# Patient Record
Sex: FEMALE | Race: WHITE | NOT HISPANIC OR LATINO | URBAN - METROPOLITAN AREA
[De-identification: names, ages, dates, MRNs, and addresses within clinical notes are randomized per-mention and may not be internally consistent; named-entity substitution may affect disease eponyms.]

---

## 2023-11-28 ENCOUNTER — EMERGENCY (EMERGENCY)
Facility: HOSPITAL | Age: 73
LOS: 0 days | Discharge: HOME | End: 2023-11-28
Attending: EMERGENCY MEDICINE
Payer: MEDICARE

## 2023-11-28 VITALS
OXYGEN SATURATION: 94 % | SYSTOLIC BLOOD PRESSURE: 138 MMHG | TEMPERATURE: 98 F | RESPIRATION RATE: 15 BRPM | DIASTOLIC BLOOD PRESSURE: 84 MMHG | HEART RATE: 84 BPM

## 2023-11-28 VITALS
DIASTOLIC BLOOD PRESSURE: 98 MMHG | HEIGHT: 58 IN | SYSTOLIC BLOOD PRESSURE: 167 MMHG | HEART RATE: 105 BPM | OXYGEN SATURATION: 97 % | TEMPERATURE: 99 F | WEIGHT: 184.97 LBS | RESPIRATION RATE: 19 BRPM

## 2023-11-28 DIAGNOSIS — R42 DIZZINESS AND GIDDINESS: ICD-10-CM

## 2023-11-28 DIAGNOSIS — I10 ESSENTIAL (PRIMARY) HYPERTENSION: ICD-10-CM

## 2023-11-28 DIAGNOSIS — R20.0 ANESTHESIA OF SKIN: ICD-10-CM

## 2023-11-28 DIAGNOSIS — E78.5 HYPERLIPIDEMIA, UNSPECIFIED: ICD-10-CM

## 2023-11-28 DIAGNOSIS — Z86.39 PERSONAL HISTORY OF OTHER ENDOCRINE, NUTRITIONAL AND METABOLIC DISEASE: ICD-10-CM

## 2023-11-28 LAB
ALBUMIN SERPL ELPH-MCNC: 3.7 G/DL — SIGNIFICANT CHANGE UP (ref 3.3–5)
ALBUMIN SERPL ELPH-MCNC: 3.7 G/DL — SIGNIFICANT CHANGE UP (ref 3.3–5)
ALP SERPL-CCNC: 111 U/L — SIGNIFICANT CHANGE UP (ref 40–120)
ALP SERPL-CCNC: 111 U/L — SIGNIFICANT CHANGE UP (ref 40–120)
ALT FLD-CCNC: 34 U/L — SIGNIFICANT CHANGE UP (ref 12–78)
ALT FLD-CCNC: 34 U/L — SIGNIFICANT CHANGE UP (ref 12–78)
ANION GAP SERPL CALC-SCNC: 7 MMOL/L — SIGNIFICANT CHANGE UP (ref 5–17)
ANION GAP SERPL CALC-SCNC: 7 MMOL/L — SIGNIFICANT CHANGE UP (ref 5–17)
APTT BLD: 36.1 SEC — HIGH (ref 24.5–35.6)
APTT BLD: 36.1 SEC — HIGH (ref 24.5–35.6)
AST SERPL-CCNC: 12 U/L — LOW (ref 15–37)
AST SERPL-CCNC: 12 U/L — LOW (ref 15–37)
BASOPHILS # BLD AUTO: 0.04 K/UL — SIGNIFICANT CHANGE UP (ref 0–0.2)
BASOPHILS # BLD AUTO: 0.04 K/UL — SIGNIFICANT CHANGE UP (ref 0–0.2)
BASOPHILS NFR BLD AUTO: 0.8 % — SIGNIFICANT CHANGE UP (ref 0–2)
BASOPHILS NFR BLD AUTO: 0.8 % — SIGNIFICANT CHANGE UP (ref 0–2)
BILIRUB SERPL-MCNC: 0.4 MG/DL — SIGNIFICANT CHANGE UP (ref 0.2–1.2)
BILIRUB SERPL-MCNC: 0.4 MG/DL — SIGNIFICANT CHANGE UP (ref 0.2–1.2)
BUN SERPL-MCNC: 18 MG/DL — SIGNIFICANT CHANGE UP (ref 7–23)
BUN SERPL-MCNC: 18 MG/DL — SIGNIFICANT CHANGE UP (ref 7–23)
CALCIUM SERPL-MCNC: 9.3 MG/DL — SIGNIFICANT CHANGE UP (ref 8.5–10.1)
CALCIUM SERPL-MCNC: 9.3 MG/DL — SIGNIFICANT CHANGE UP (ref 8.5–10.1)
CHLORIDE SERPL-SCNC: 107 MMOL/L — SIGNIFICANT CHANGE UP (ref 96–108)
CHLORIDE SERPL-SCNC: 107 MMOL/L — SIGNIFICANT CHANGE UP (ref 96–108)
CO2 SERPL-SCNC: 28 MMOL/L — SIGNIFICANT CHANGE UP (ref 22–31)
CO2 SERPL-SCNC: 28 MMOL/L — SIGNIFICANT CHANGE UP (ref 22–31)
CREAT SERPL-MCNC: 1.3 MG/DL — SIGNIFICANT CHANGE UP (ref 0.5–1.3)
CREAT SERPL-MCNC: 1.3 MG/DL — SIGNIFICANT CHANGE UP (ref 0.5–1.3)
EGFR: 43 ML/MIN/1.73M2 — LOW
EGFR: 43 ML/MIN/1.73M2 — LOW
EOSINOPHIL # BLD AUTO: 0.18 K/UL — SIGNIFICANT CHANGE UP (ref 0–0.5)
EOSINOPHIL # BLD AUTO: 0.18 K/UL — SIGNIFICANT CHANGE UP (ref 0–0.5)
EOSINOPHIL NFR BLD AUTO: 3.4 % — SIGNIFICANT CHANGE UP (ref 0–6)
EOSINOPHIL NFR BLD AUTO: 3.4 % — SIGNIFICANT CHANGE UP (ref 0–6)
GLUCOSE SERPL-MCNC: 121 MG/DL — HIGH (ref 70–99)
GLUCOSE SERPL-MCNC: 121 MG/DL — HIGH (ref 70–99)
HCT VFR BLD CALC: 38.3 % — SIGNIFICANT CHANGE UP (ref 34.5–45)
HCT VFR BLD CALC: 38.3 % — SIGNIFICANT CHANGE UP (ref 34.5–45)
HGB BLD-MCNC: 12.7 G/DL — SIGNIFICANT CHANGE UP (ref 11.5–15.5)
HGB BLD-MCNC: 12.7 G/DL — SIGNIFICANT CHANGE UP (ref 11.5–15.5)
IMM GRANULOCYTES NFR BLD AUTO: 0.4 % — SIGNIFICANT CHANGE UP (ref 0–0.9)
IMM GRANULOCYTES NFR BLD AUTO: 0.4 % — SIGNIFICANT CHANGE UP (ref 0–0.9)
INR BLD: 0.89 RATIO — SIGNIFICANT CHANGE UP (ref 0.85–1.18)
INR BLD: 0.89 RATIO — SIGNIFICANT CHANGE UP (ref 0.85–1.18)
LYMPHOCYTES # BLD AUTO: 1.25 K/UL — SIGNIFICANT CHANGE UP (ref 1–3.3)
LYMPHOCYTES # BLD AUTO: 1.25 K/UL — SIGNIFICANT CHANGE UP (ref 1–3.3)
LYMPHOCYTES # BLD AUTO: 23.6 % — SIGNIFICANT CHANGE UP (ref 13–44)
LYMPHOCYTES # BLD AUTO: 23.6 % — SIGNIFICANT CHANGE UP (ref 13–44)
MAGNESIUM SERPL-MCNC: 2.2 MG/DL — SIGNIFICANT CHANGE UP (ref 1.6–2.6)
MAGNESIUM SERPL-MCNC: 2.2 MG/DL — SIGNIFICANT CHANGE UP (ref 1.6–2.6)
MCHC RBC-ENTMCNC: 30.2 PG — SIGNIFICANT CHANGE UP (ref 27–34)
MCHC RBC-ENTMCNC: 30.2 PG — SIGNIFICANT CHANGE UP (ref 27–34)
MCHC RBC-ENTMCNC: 33.2 G/DL — SIGNIFICANT CHANGE UP (ref 32–36)
MCHC RBC-ENTMCNC: 33.2 G/DL — SIGNIFICANT CHANGE UP (ref 32–36)
MCV RBC AUTO: 91 FL — SIGNIFICANT CHANGE UP (ref 80–100)
MCV RBC AUTO: 91 FL — SIGNIFICANT CHANGE UP (ref 80–100)
MONOCYTES # BLD AUTO: 0.38 K/UL — SIGNIFICANT CHANGE UP (ref 0–0.9)
MONOCYTES # BLD AUTO: 0.38 K/UL — SIGNIFICANT CHANGE UP (ref 0–0.9)
MONOCYTES NFR BLD AUTO: 7.2 % — SIGNIFICANT CHANGE UP (ref 2–14)
MONOCYTES NFR BLD AUTO: 7.2 % — SIGNIFICANT CHANGE UP (ref 2–14)
NEUTROPHILS # BLD AUTO: 3.42 K/UL — SIGNIFICANT CHANGE UP (ref 1.8–7.4)
NEUTROPHILS # BLD AUTO: 3.42 K/UL — SIGNIFICANT CHANGE UP (ref 1.8–7.4)
NEUTROPHILS NFR BLD AUTO: 64.6 % — SIGNIFICANT CHANGE UP (ref 43–77)
NEUTROPHILS NFR BLD AUTO: 64.6 % — SIGNIFICANT CHANGE UP (ref 43–77)
NRBC # BLD: 0 /100 WBCS — SIGNIFICANT CHANGE UP (ref 0–0)
NRBC # BLD: 0 /100 WBCS — SIGNIFICANT CHANGE UP (ref 0–0)
PLATELET # BLD AUTO: 259 K/UL — SIGNIFICANT CHANGE UP (ref 150–400)
PLATELET # BLD AUTO: 259 K/UL — SIGNIFICANT CHANGE UP (ref 150–400)
POTASSIUM SERPL-MCNC: 3.4 MMOL/L — LOW (ref 3.5–5.3)
POTASSIUM SERPL-MCNC: 3.4 MMOL/L — LOW (ref 3.5–5.3)
POTASSIUM SERPL-SCNC: 3.4 MMOL/L — LOW (ref 3.5–5.3)
POTASSIUM SERPL-SCNC: 3.4 MMOL/L — LOW (ref 3.5–5.3)
PROT SERPL-MCNC: 7.7 GM/DL — SIGNIFICANT CHANGE UP (ref 6–8.3)
PROT SERPL-MCNC: 7.7 GM/DL — SIGNIFICANT CHANGE UP (ref 6–8.3)
PROTHROM AB SERPL-ACNC: 10.6 SEC — SIGNIFICANT CHANGE UP (ref 9.5–13)
PROTHROM AB SERPL-ACNC: 10.6 SEC — SIGNIFICANT CHANGE UP (ref 9.5–13)
RBC # BLD: 4.21 M/UL — SIGNIFICANT CHANGE UP (ref 3.8–5.2)
RBC # BLD: 4.21 M/UL — SIGNIFICANT CHANGE UP (ref 3.8–5.2)
RBC # FLD: 13.2 % — SIGNIFICANT CHANGE UP (ref 10.3–14.5)
RBC # FLD: 13.2 % — SIGNIFICANT CHANGE UP (ref 10.3–14.5)
SODIUM SERPL-SCNC: 142 MMOL/L — SIGNIFICANT CHANGE UP (ref 135–145)
SODIUM SERPL-SCNC: 142 MMOL/L — SIGNIFICANT CHANGE UP (ref 135–145)
TROPONIN I, HIGH SENSITIVITY RESULT: 7.3 NG/L — SIGNIFICANT CHANGE UP
TROPONIN I, HIGH SENSITIVITY RESULT: 7.3 NG/L — SIGNIFICANT CHANGE UP
WBC # BLD: 5.29 K/UL — SIGNIFICANT CHANGE UP (ref 3.8–10.5)
WBC # BLD: 5.29 K/UL — SIGNIFICANT CHANGE UP (ref 3.8–10.5)
WBC # FLD AUTO: 5.29 K/UL — SIGNIFICANT CHANGE UP (ref 3.8–10.5)
WBC # FLD AUTO: 5.29 K/UL — SIGNIFICANT CHANGE UP (ref 3.8–10.5)

## 2023-11-28 PROCEDURE — 70496 CT ANGIOGRAPHY HEAD: CPT | Mod: 26,MA

## 2023-11-28 PROCEDURE — 70498 CT ANGIOGRAPHY NECK: CPT | Mod: 26,MA

## 2023-11-28 PROCEDURE — 93010 ELECTROCARDIOGRAM REPORT: CPT

## 2023-11-28 PROCEDURE — 70450 CT HEAD/BRAIN W/O DYE: CPT | Mod: 26,MA,XU

## 2023-11-28 PROCEDURE — 99285 EMERGENCY DEPT VISIT HI MDM: CPT

## 2023-11-28 RX ORDER — POTASSIUM CHLORIDE 20 MEQ
40 PACKET (EA) ORAL ONCE
Refills: 0 | Status: COMPLETED | OUTPATIENT
Start: 2023-11-28 | End: 2023-11-28

## 2023-11-28 RX ORDER — SODIUM CHLORIDE 9 MG/ML
1000 INJECTION INTRAMUSCULAR; INTRAVENOUS; SUBCUTANEOUS ONCE
Refills: 0 | Status: COMPLETED | OUTPATIENT
Start: 2023-11-28 | End: 2023-11-28

## 2023-11-28 RX ADMIN — SODIUM CHLORIDE 1000 MILLILITER(S): 9 INJECTION INTRAMUSCULAR; INTRAVENOUS; SUBCUTANEOUS at 08:52

## 2023-11-28 RX ADMIN — Medication 40 MILLIEQUIVALENT(S): at 09:28

## 2023-11-28 NOTE — ED ADULT NURSE NOTE - NSFALLUNIVINTERV_ED_ALL_ED
Bed/Stretcher in lowest position, wheels locked, appropriate side rails in place/Call bell, personal items and telephone in reach/Instruct patient to call for assistance before getting out of bed/chair/stretcher/Non-slip footwear applied when patient is off stretcher/[place] to call system/Physically safe environment - no spills, clutter or unnecessary equipment/Purposeful proactive rounding/Room/bathroom lighting operational, light cord in reach

## 2023-11-28 NOTE — ED PROVIDER NOTE - CARE PROVIDER_API CALL
Terry Drake  Neurosurgery  [address] [address] [facility] [address]  [place] [address]  Phone: [phone]  Fax: [phone]  Follow Up Time: 4-6 Days    Delfina Tariq  Neurology  [address] [place] [address]  Phone: [phone]  Fax: [phone]  Follow Up Time: 4-6 Days

## 2023-11-28 NOTE — ED PROVIDER NOTE - CLINICAL SUMMARY MEDICAL DECISION MAKING FREE TEXT BOX
dizziness / lightheadedness. stroke scale is 0. will obtain ct head and ct angio to eval for vert insuff.   will obtain trop  check lytes, give fluids.  I read ekg as nsr rate 97, no st elevation or depression, qtc 469, narrow qrs, normal axis. dizziness / lightheadedness. stroke scale is 0. will obtain ct head and ct angio to eval for vert insuff.   will obtain trop  check lytes, give fluids.  I read ekg as nsr rate 97, no st elevation or depression, qtc 469, narrow qrs, normal axis.  ct reassuring. will let dc home and fu with neuro and neurosurgery

## 2023-11-28 NOTE — ED PROVIDER NOTE - PHYSICAL EXAMINATION
Gen: Alert, NAD  Head: NC, AT   Eyes: PERRL, EOMI, normal lids/conjunctiva  ENT: normal hearing, patent oropharynx without erythema/exudate, uvula midline  Neck: supple, no tenderness, Trachea midline  Pulm: Bilateral BS, normal resp effort, no wheeze/stridor/retractions  CV: RRR, no M/R/G, 2+ radial and dp pulses bl, no edema  Abd: soft, NT/ND, +BS, no hepatosplenomegaly  Mskel: extremities x4 with normal ROM and no joint effusions. no ctl spine ttp.   Skin: no rash, no bruising   Neuro: AAOx3, no sensory/motor deficits, CN 2-12 intact. finger to nose intact bl. neg romberg

## 2023-11-28 NOTE — ED PROVIDER NOTE - PROVIDER TOKENS
PROVIDER:[TOKEN:[[id number]],FOLLOWUP:[4-6 Days]],PROVIDER:[TOKEN:[[id number]],FOLLOWUP:[4-6 Days]]

## 2023-11-28 NOTE — ED PROVIDER NOTE - CARE PROVIDERS DIRECT ADDRESSES
,[email or web address]

## 2023-11-28 NOTE — ED ADULT NURSE NOTE - OBJECTIVE STATEMENT
Pt presents to the ED c/o left arm numbness and tingling x 6:30 am today after waking. Pt went to bed around midnight and endorse lightheadedness and tingling to left side of face. Pt is noted to be alert and oriented x4, no slurred speech, pronator drift, weakness of extremities or drooling was noted. Pt is ambulatory with steady gait. pt reports having palpitations this morning. Pt denies chest pain and was placed on cardiac monitor NSR noted in 80's. No peripheral edema was noted.

## 2023-11-28 NOTE — ED PROVIDER NOTE - OBJECTIVE STATEMENT
73F hx htn, hld, hypothyroid pw lightheadedness and left arm numbness. she woke up with these symptoms though notes she had left arm blood draw yesterday. no slurred speech, vision change, isolated weakness. no cp. no fever. doesn't make a diff if she is sitting or standing or laying

## 2023-11-28 NOTE — ED PROVIDER NOTE - PATIENT PORTAL LINK FT
You can access the FollowMyHealth Patient Portal offered by [facility] by registering at the following website: [email or web address]. By joining [employer]’s FollowMyHealth portal, you will also be able to view your health information using other applications (apps) compatible with our system.

## 2023-11-29 NOTE — ED POST DISCHARGE NOTE - REASON FOR FOLLOW-UP
Other Patient called stating that she received a message on her phone about setting up Neurology appointment. Patient states she will follow-up with her PMD next week and then have her refer to Neurology.

## 2023-12-01 ENCOUNTER — EMERGENCY (EMERGENCY)
Facility: HOSPITAL | Age: 73
LOS: 0 days | Discharge: HOME | End: 2023-12-01
Attending: EMERGENCY MEDICINE
Payer: MEDICARE

## 2023-12-01 VITALS
HEART RATE: 97 BPM | SYSTOLIC BLOOD PRESSURE: 141 MMHG | OXYGEN SATURATION: 97 % | RESPIRATION RATE: 14 BRPM | TEMPERATURE: 98 F | DIASTOLIC BLOOD PRESSURE: 83 MMHG

## 2023-12-01 VITALS
OXYGEN SATURATION: 98 % | SYSTOLIC BLOOD PRESSURE: 162 MMHG | TEMPERATURE: 99 F | WEIGHT: 182.1 LBS | RESPIRATION RATE: 18 BRPM | HEART RATE: 110 BPM | DIASTOLIC BLOOD PRESSURE: 91 MMHG | HEIGHT: 58 IN

## 2023-12-01 DIAGNOSIS — R14.0 ABDOMINAL DISTENSION (GASEOUS): ICD-10-CM

## 2023-12-01 DIAGNOSIS — R19.7 DIARRHEA, UNSPECIFIED: ICD-10-CM

## 2023-12-01 DIAGNOSIS — R11.0 NAUSEA: ICD-10-CM

## 2023-12-01 DIAGNOSIS — K52.9 NONINFECTIVE GASTROENTERITIS AND COLITIS, UNSPECIFIED: ICD-10-CM

## 2023-12-01 DIAGNOSIS — E78.5 HYPERLIPIDEMIA, UNSPECIFIED: ICD-10-CM

## 2023-12-01 DIAGNOSIS — E03.9 HYPOTHYROIDISM, UNSPECIFIED: ICD-10-CM

## 2023-12-01 DIAGNOSIS — I10 ESSENTIAL (PRIMARY) HYPERTENSION: ICD-10-CM

## 2023-12-01 LAB
ALBUMIN SERPL ELPH-MCNC: 4 G/DL — SIGNIFICANT CHANGE UP (ref 3.3–5)
ALBUMIN SERPL ELPH-MCNC: 4 G/DL — SIGNIFICANT CHANGE UP (ref 3.3–5)
ALP SERPL-CCNC: 129 U/L — HIGH (ref 40–120)
ALP SERPL-CCNC: 129 U/L — HIGH (ref 40–120)
ALT FLD-CCNC: 31 U/L — SIGNIFICANT CHANGE UP (ref 12–78)
ALT FLD-CCNC: 31 U/L — SIGNIFICANT CHANGE UP (ref 12–78)
ANION GAP SERPL CALC-SCNC: 10 MMOL/L — SIGNIFICANT CHANGE UP (ref 5–17)
ANION GAP SERPL CALC-SCNC: 10 MMOL/L — SIGNIFICANT CHANGE UP (ref 5–17)
APPEARANCE UR: CLEAR — SIGNIFICANT CHANGE UP
APPEARANCE UR: CLEAR — SIGNIFICANT CHANGE UP
APTT BLD: 31.7 SEC — SIGNIFICANT CHANGE UP (ref 24.5–35.6)
APTT BLD: 31.7 SEC — SIGNIFICANT CHANGE UP (ref 24.5–35.6)
AST SERPL-CCNC: 15 U/L — SIGNIFICANT CHANGE UP (ref 15–37)
AST SERPL-CCNC: 15 U/L — SIGNIFICANT CHANGE UP (ref 15–37)
BACTERIA # UR AUTO: ABNORMAL /HPF
BACTERIA # UR AUTO: ABNORMAL /HPF
BASOPHILS # BLD AUTO: 0.04 K/UL — SIGNIFICANT CHANGE UP (ref 0–0.2)
BASOPHILS # BLD AUTO: 0.04 K/UL — SIGNIFICANT CHANGE UP (ref 0–0.2)
BASOPHILS NFR BLD AUTO: 0.2 % — SIGNIFICANT CHANGE UP (ref 0–2)
BASOPHILS NFR BLD AUTO: 0.2 % — SIGNIFICANT CHANGE UP (ref 0–2)
BILIRUB SERPL-MCNC: 0.5 MG/DL — SIGNIFICANT CHANGE UP (ref 0.2–1.2)
BILIRUB SERPL-MCNC: 0.5 MG/DL — SIGNIFICANT CHANGE UP (ref 0.2–1.2)
BILIRUB UR-MCNC: NEGATIVE — SIGNIFICANT CHANGE UP
BILIRUB UR-MCNC: NEGATIVE — SIGNIFICANT CHANGE UP
BLD GP AB SCN SERPL QL: SIGNIFICANT CHANGE UP
BLD GP AB SCN SERPL QL: SIGNIFICANT CHANGE UP
BUN SERPL-MCNC: 18 MG/DL — SIGNIFICANT CHANGE UP (ref 7–23)
BUN SERPL-MCNC: 18 MG/DL — SIGNIFICANT CHANGE UP (ref 7–23)
CALCIUM SERPL-MCNC: 9.6 MG/DL — SIGNIFICANT CHANGE UP (ref 8.5–10.1)
CALCIUM SERPL-MCNC: 9.6 MG/DL — SIGNIFICANT CHANGE UP (ref 8.5–10.1)
CHLORIDE SERPL-SCNC: 104 MMOL/L — SIGNIFICANT CHANGE UP (ref 96–108)
CHLORIDE SERPL-SCNC: 104 MMOL/L — SIGNIFICANT CHANGE UP (ref 96–108)
CO2 SERPL-SCNC: 27 MMOL/L — SIGNIFICANT CHANGE UP (ref 22–31)
CO2 SERPL-SCNC: 27 MMOL/L — SIGNIFICANT CHANGE UP (ref 22–31)
COLOR SPEC: YELLOW — SIGNIFICANT CHANGE UP
COLOR SPEC: YELLOW — SIGNIFICANT CHANGE UP
CREAT SERPL-MCNC: 1.2 MG/DL — SIGNIFICANT CHANGE UP (ref 0.5–1.3)
CREAT SERPL-MCNC: 1.2 MG/DL — SIGNIFICANT CHANGE UP (ref 0.5–1.3)
DIFF PNL FLD: ABNORMAL
DIFF PNL FLD: ABNORMAL
EGFR: 48 ML/MIN/1.73M2 — LOW
EGFR: 48 ML/MIN/1.73M2 — LOW
EOSINOPHIL # BLD AUTO: 0.05 K/UL — SIGNIFICANT CHANGE UP (ref 0–0.5)
EOSINOPHIL # BLD AUTO: 0.05 K/UL — SIGNIFICANT CHANGE UP (ref 0–0.5)
EOSINOPHIL NFR BLD AUTO: 0.3 % — SIGNIFICANT CHANGE UP (ref 0–6)
EOSINOPHIL NFR BLD AUTO: 0.3 % — SIGNIFICANT CHANGE UP (ref 0–6)
EPI CELLS # UR: PRESENT
EPI CELLS # UR: PRESENT
GLUCOSE SERPL-MCNC: 141 MG/DL — HIGH (ref 70–99)
GLUCOSE SERPL-MCNC: 141 MG/DL — HIGH (ref 70–99)
GLUCOSE UR QL: NEGATIVE MG/DL — SIGNIFICANT CHANGE UP
GLUCOSE UR QL: NEGATIVE MG/DL — SIGNIFICANT CHANGE UP
HCT VFR BLD CALC: 40.7 % — SIGNIFICANT CHANGE UP (ref 34.5–45)
HCT VFR BLD CALC: 40.7 % — SIGNIFICANT CHANGE UP (ref 34.5–45)
HGB BLD-MCNC: 13.4 G/DL — SIGNIFICANT CHANGE UP (ref 11.5–15.5)
HGB BLD-MCNC: 13.4 G/DL — SIGNIFICANT CHANGE UP (ref 11.5–15.5)
IMM GRANULOCYTES NFR BLD AUTO: 0.4 % — SIGNIFICANT CHANGE UP (ref 0–0.9)
IMM GRANULOCYTES NFR BLD AUTO: 0.4 % — SIGNIFICANT CHANGE UP (ref 0–0.9)
INR BLD: 0.9 RATIO — SIGNIFICANT CHANGE UP (ref 0.85–1.18)
INR BLD: 0.9 RATIO — SIGNIFICANT CHANGE UP (ref 0.85–1.18)
KETONES UR-MCNC: NEGATIVE MG/DL — SIGNIFICANT CHANGE UP
KETONES UR-MCNC: NEGATIVE MG/DL — SIGNIFICANT CHANGE UP
LACTATE SERPL-SCNC: 1.5 MMOL/L — SIGNIFICANT CHANGE UP (ref 0.7–2)
LACTATE SERPL-SCNC: 1.5 MMOL/L — SIGNIFICANT CHANGE UP (ref 0.7–2)
LACTATE SERPL-SCNC: 2.9 MMOL/L — HIGH (ref 0.7–2)
LACTATE SERPL-SCNC: 2.9 MMOL/L — HIGH (ref 0.7–2)
LEUKOCYTE ESTERASE UR-ACNC: ABNORMAL
LEUKOCYTE ESTERASE UR-ACNC: ABNORMAL
LIDOCAIN IGE QN: 39 U/L — SIGNIFICANT CHANGE UP (ref 13–75)
LIDOCAIN IGE QN: 39 U/L — SIGNIFICANT CHANGE UP (ref 13–75)
LYMPHOCYTES # BLD AUTO: 1.1 K/UL — SIGNIFICANT CHANGE UP (ref 1–3.3)
LYMPHOCYTES # BLD AUTO: 1.1 K/UL — SIGNIFICANT CHANGE UP (ref 1–3.3)
LYMPHOCYTES # BLD AUTO: 6.8 % — LOW (ref 13–44)
LYMPHOCYTES # BLD AUTO: 6.8 % — LOW (ref 13–44)
MAGNESIUM SERPL-MCNC: 2.2 MG/DL — SIGNIFICANT CHANGE UP (ref 1.6–2.6)
MAGNESIUM SERPL-MCNC: 2.2 MG/DL — SIGNIFICANT CHANGE UP (ref 1.6–2.6)
MCHC RBC-ENTMCNC: 29.8 PG — SIGNIFICANT CHANGE UP (ref 27–34)
MCHC RBC-ENTMCNC: 29.8 PG — SIGNIFICANT CHANGE UP (ref 27–34)
MCHC RBC-ENTMCNC: 32.9 G/DL — SIGNIFICANT CHANGE UP (ref 32–36)
MCHC RBC-ENTMCNC: 32.9 G/DL — SIGNIFICANT CHANGE UP (ref 32–36)
MCV RBC AUTO: 90.4 FL — SIGNIFICANT CHANGE UP (ref 80–100)
MCV RBC AUTO: 90.4 FL — SIGNIFICANT CHANGE UP (ref 80–100)
MONOCYTES # BLD AUTO: 1.22 K/UL — HIGH (ref 0–0.9)
MONOCYTES # BLD AUTO: 1.22 K/UL — HIGH (ref 0–0.9)
MONOCYTES NFR BLD AUTO: 7.6 % — SIGNIFICANT CHANGE UP (ref 2–14)
MONOCYTES NFR BLD AUTO: 7.6 % — SIGNIFICANT CHANGE UP (ref 2–14)
NEUTROPHILS # BLD AUTO: 13.61 K/UL — HIGH (ref 1.8–7.4)
NEUTROPHILS # BLD AUTO: 13.61 K/UL — HIGH (ref 1.8–7.4)
NEUTROPHILS NFR BLD AUTO: 84.7 % — HIGH (ref 43–77)
NEUTROPHILS NFR BLD AUTO: 84.7 % — HIGH (ref 43–77)
NITRITE UR-MCNC: NEGATIVE — SIGNIFICANT CHANGE UP
NITRITE UR-MCNC: NEGATIVE — SIGNIFICANT CHANGE UP
NRBC # BLD: 0 /100 WBCS — SIGNIFICANT CHANGE UP (ref 0–0)
NRBC # BLD: 0 /100 WBCS — SIGNIFICANT CHANGE UP (ref 0–0)
PH UR: 6 — SIGNIFICANT CHANGE UP (ref 5–8)
PH UR: 6 — SIGNIFICANT CHANGE UP (ref 5–8)
PLATELET # BLD AUTO: 262 K/UL — SIGNIFICANT CHANGE UP (ref 150–400)
PLATELET # BLD AUTO: 262 K/UL — SIGNIFICANT CHANGE UP (ref 150–400)
POTASSIUM SERPL-MCNC: 3.4 MMOL/L — LOW (ref 3.5–5.3)
POTASSIUM SERPL-MCNC: 3.4 MMOL/L — LOW (ref 3.5–5.3)
POTASSIUM SERPL-SCNC: 3.4 MMOL/L — LOW (ref 3.5–5.3)
POTASSIUM SERPL-SCNC: 3.4 MMOL/L — LOW (ref 3.5–5.3)
PROT SERPL-MCNC: 8.2 GM/DL — SIGNIFICANT CHANGE UP (ref 6–8.3)
PROT SERPL-MCNC: 8.2 GM/DL — SIGNIFICANT CHANGE UP (ref 6–8.3)
PROT UR-MCNC: NEGATIVE MG/DL — SIGNIFICANT CHANGE UP
PROT UR-MCNC: NEGATIVE MG/DL — SIGNIFICANT CHANGE UP
PROTHROM AB SERPL-ACNC: 10.8 SEC — SIGNIFICANT CHANGE UP (ref 9.5–13)
PROTHROM AB SERPL-ACNC: 10.8 SEC — SIGNIFICANT CHANGE UP (ref 9.5–13)
RBC # BLD: 4.5 M/UL — SIGNIFICANT CHANGE UP (ref 3.8–5.2)
RBC # BLD: 4.5 M/UL — SIGNIFICANT CHANGE UP (ref 3.8–5.2)
RBC # FLD: 13.2 % — SIGNIFICANT CHANGE UP (ref 10.3–14.5)
RBC # FLD: 13.2 % — SIGNIFICANT CHANGE UP (ref 10.3–14.5)
RBC CASTS # UR COMP ASSIST: 2 /HPF — SIGNIFICANT CHANGE UP (ref 0–4)
RBC CASTS # UR COMP ASSIST: 2 /HPF — SIGNIFICANT CHANGE UP (ref 0–4)
SODIUM SERPL-SCNC: 141 MMOL/L — SIGNIFICANT CHANGE UP (ref 135–145)
SODIUM SERPL-SCNC: 141 MMOL/L — SIGNIFICANT CHANGE UP (ref 135–145)
SP GR SPEC: >1.03 — HIGH (ref 1–1.03)
SP GR SPEC: >1.03 — HIGH (ref 1–1.03)
UROBILINOGEN FLD QL: 0.2 MG/DL — SIGNIFICANT CHANGE UP (ref 0.2–1)
UROBILINOGEN FLD QL: 0.2 MG/DL — SIGNIFICANT CHANGE UP (ref 0.2–1)
WBC # BLD: 16.09 K/UL — HIGH (ref 3.8–10.5)
WBC # BLD: 16.09 K/UL — HIGH (ref 3.8–10.5)
WBC # FLD AUTO: 16.09 K/UL — HIGH (ref 3.8–10.5)
WBC # FLD AUTO: 16.09 K/UL — HIGH (ref 3.8–10.5)
WBC UR QL: 2 /HPF — SIGNIFICANT CHANGE UP (ref 0–5)
WBC UR QL: 2 /HPF — SIGNIFICANT CHANGE UP (ref 0–5)

## 2023-12-01 PROCEDURE — 99285 EMERGENCY DEPT VISIT HI MDM: CPT | Mod: FS

## 2023-12-01 PROCEDURE — 74177 CT ABD & PELVIS W/CONTRAST: CPT | Mod: 26,MA

## 2023-12-01 RX ORDER — ACETAMINOPHEN 500 MG
2 TABLET ORAL
Qty: 30 | Refills: 0
Start: 2023-12-01 | End: 2023-12-05

## 2023-12-01 RX ORDER — SODIUM CHLORIDE 9 MG/ML
1000 INJECTION INTRAMUSCULAR; INTRAVENOUS; SUBCUTANEOUS ONCE
Refills: 0 | Status: COMPLETED | OUTPATIENT
Start: 2023-12-01 | End: 2023-12-01

## 2023-12-01 RX ORDER — SODIUM CHLORIDE 9 MG/ML
1000 INJECTION INTRAMUSCULAR; INTRAVENOUS; SUBCUTANEOUS ONCE
Refills: 0 | Status: DISCONTINUED | OUTPATIENT
Start: 2023-12-01 | End: 2023-12-01

## 2023-12-01 RX ORDER — ONDANSETRON 8 MG/1
4 TABLET, FILM COATED ORAL ONCE
Refills: 0 | Status: COMPLETED | OUTPATIENT
Start: 2023-12-01 | End: 2023-12-01

## 2023-12-01 RX ORDER — METRONIDAZOLE 500 MG
1 TABLET ORAL
Qty: 21 | Refills: 0
Start: 2023-12-01 | End: 2023-12-07

## 2023-12-01 RX ORDER — ACETAMINOPHEN 500 MG
1000 TABLET ORAL ONCE
Refills: 0 | Status: COMPLETED | OUTPATIENT
Start: 2023-12-01 | End: 2023-12-01

## 2023-12-01 RX ORDER — PIPERACILLIN AND TAZOBACTAM 4; .5 G/20ML; G/20ML
3.38 INJECTION, POWDER, LYOPHILIZED, FOR SOLUTION INTRAVENOUS ONCE
Refills: 0 | Status: COMPLETED | OUTPATIENT
Start: 2023-12-01 | End: 2023-12-01

## 2023-12-01 RX ADMIN — Medication 400 MILLIGRAM(S): at 17:40

## 2023-12-01 RX ADMIN — Medication 1000 MILLIGRAM(S): at 18:00

## 2023-12-01 RX ADMIN — SODIUM CHLORIDE 1000 MILLILITER(S): 9 INJECTION INTRAMUSCULAR; INTRAVENOUS; SUBCUTANEOUS at 18:40

## 2023-12-01 RX ADMIN — ONDANSETRON 4 MILLIGRAM(S): 8 TABLET, FILM COATED ORAL at 17:40

## 2023-12-01 RX ADMIN — PIPERACILLIN AND TAZOBACTAM 200 GRAM(S): 4; .5 INJECTION, POWDER, LYOPHILIZED, FOR SOLUTION INTRAVENOUS at 20:42

## 2023-12-01 RX ADMIN — SODIUM CHLORIDE 1000 MILLILITER(S): 9 INJECTION INTRAMUSCULAR; INTRAVENOUS; SUBCUTANEOUS at 17:40

## 2023-12-01 NOTE — ED PROVIDER NOTE - PATIENT PORTAL LINK FT
You can access the FollowMyHealth Patient Portal offered by [facility] by registering at the following website: [email or web address]. By joining [employer]’s FollowMyHealth portal, you will also be able to view your health information using other applications (apps) compatible with our system.

## 2023-12-01 NOTE — ED PROVIDER NOTE - NS ED ATTENDING STATEMENT MOD
This was a shared visit with the [facility]. I reviewed and verified the documentation and independently performed the documented:

## 2023-12-01 NOTE — ED PROVIDER NOTE - CARE PLAN
1 Principal Discharge DX:	Bloody diarrhea   Principal Discharge DX:	Bloody diarrhea  Secondary Diagnosis:	Colitis

## 2023-12-01 NOTE — ED PROVIDER NOTE - PHYSICAL EXAMINATION
GEN: Awake, alert, interactive, NAD.  HEAD AND NECK: NC/AT. Airway patent. Neck supple.   EYES:  Clear b/l.   ENT: Moist mucus membranes.   CARDIAC: Regular rate, regular rhythm. No evident pedal edema.    RESP/CHEST: Normal respiratory effort with no use of accessory muscles or retractions. Clear throughout on auscultation.  ABD: soft, non-distended, (+) mild lower abdominal tenderness.  No rebound, no guarding.   RECTAL: (-) active bleeding, (+) scant blood on ANA  BACK: No midline spinal TTP. No CVAT.   EXTREMITIES: Moving all extremities with no apparent deformities.   SKIN: Warm, dry, intact normal color. No rash.   NEURO: AOx3,  no focal deficits.   PSYCH: Appropriate mood and affect.

## 2023-12-01 NOTE — ED ADULT NURSE NOTE - OBJECTIVE STATEMENT
Pt aaox4, presents to ed from home c/o multiple episodes of diarrhea overnight a/w nausea, weakness, BRBPR and lower abdominal pain. Pt denies cp, sob, dizziness, palpitations, headache. numbness/tingling, weakness, vomiting, fever/chills. Pmhx HTN, HLD. Respirations even and unlabored. Pt's daughter at bedside.

## 2023-12-01 NOTE — ED PROVIDER NOTE - CLINICAL SUMMARY MEDICAL DECISION MAKING FREE TEXT BOX
72 y/o female with htn, hld, hypothyroid here with bloody diarrhea with lower abdominal pain.   Ddx include colitis, GI bleeding.   Will obtain labs, ivf, zofran, tylenol and ct a/p ordered 72 y/o female with htn, hld, hypothyroid here with bloody diarrhea with lower abdominal pain.   Ddx include colitis, GI bleeding.   Will obtain labs, ivf, zofran, tylenol and ct a/p ordered    labs reviewed and wbc 16. Lactate 2.9.   Ct a/p: Nonspecific, long segment inflammation of the distal transverse colon and   descending colon, compatible with infectious, inflammatory or ischemic   colitis.  Zosyn ordered. Will repeat lactate.   REpeat lactate 1.5  Pt reassessed and reports feeling better. Denies abdominal pain. Pt tolerated PO in the ED.

## 2023-12-01 NOTE — ED ADULT TRIAGE NOTE - CHIEF COMPLAINT QUOTE
pt c/o lower abdominal pain,  nausea, blood diarrhea and weakness since last night. history of htn and high cholesterol.

## 2023-12-01 NOTE — ED PROVIDER NOTE - CARE PROVIDER_API CALL
Leon Cobos  Gastroenterology  [address] [facility] [address]  [place] [address]  Phone: [phone]  Fax: [phone]  Follow Up Time: 4-6 Days

## 2023-12-01 NOTE — ED PROVIDER NOTE - NSFOLLOWUPINSTRUCTIONS_ED_ALL_ED_FT
You have COLITIS - this is inflammation of the large intestine. It is most likely caused by infection.       Take BOTH antibiotics as prescribed for your COLITIS - AUGMENTIN and METRONIDAZOLE/FLAGYL.    Take ACETAMINOPHEN for moderate pain.        Follow up with the GASTROENTEROLOGIST.

## 2023-12-01 NOTE — ED PROVIDER NOTE - NS ED ROS FT
CONSTITUTIONAL: No fever, no chills, no fatigue  EYES: No visual changes  ENT: No ear pain, no sore throat  CARDIOVASCULAR: No chest pain, no palpitations  RESPIRATORY: No cough, no SOB  GI:  no vomiting, no constipation  GENITOURINARY: No dysuria, no frequency, no hematuria  MUSKULOSKELETAL: No backpain, no joint pain, no myalgias  SKIN: No rash  NEURO: No headache    ALL OTHER SYSTEMS NEGATIVE.

## 2023-12-01 NOTE — ED PROVIDER NOTE - ATTENDING APP SHARED VISIT CONTRIBUTION OF CARE
[place] - colitis. likely infectious. lactate initially elevated but downtrended to normal. pt ate a meal and tolerated it perfectly. has no pain on my exam at 900pm. pt unlikely to have ischemic colitis despite bleeding. she is very well appearing. dc home on abx.

## 2023-12-01 NOTE — ED ADULT NURSE NOTE - NSFALLRISKINTERV_ED_ALL_ED
Assistance OOB with selected safe patient handling equipment if applicable/Assistance with ambulation/Communicate fall risk and risk factors to all staff, patient, and family/Provide patient with walking aids/Provide visual cue: yellow wristband, yellow gown, etc/Reinforce activity limits and safety measures with patient and family/Toileting schedule using arm’s reach rule for commode and bathroom/Call bell, personal items and telephone in reach/Instruct patient to call for assistance before getting out of bed/chair/stretcher/Non-slip footwear applied when patient is off stretcher/[place] to call system/Physically safe environment - no spills, clutter or unnecessary equipment/Purposeful Proactive Rounding/Room/bathroom lighting operational, light cord in reach

## 2023-12-01 NOTE — ED PROVIDER NOTE - OBJECTIVE STATEMENT
74 y/o female with htn, hld, hypothyroid here with diarrhea since last night. Pt reports multiple episodes of diarrhea and then noted with bright red blood. Pt also reports having lower abdominal pain and nausea. Denies fever, chills, vomiting, recent travel, sick contact, recent abx use. Pt is not on any blood thinners. Pt had colonoscopy recently and it was negative.

## 2023-12-03 ENCOUNTER — TRANSCRIPTION ENCOUNTER (OUTPATIENT)
Age: 73
End: 2023-12-03

## 2023-12-19 ENCOUNTER — INPATIENT (INPATIENT)
Facility: HOSPITAL | Age: 73
LOS: 2 days | Discharge: HOME | End: 2023-12-22
Attending: INTERNAL MEDICINE | Admitting: INTERNAL MEDICINE
Payer: MEDICARE

## 2023-12-19 VITALS
RESPIRATION RATE: 18 BRPM | WEIGHT: 184.97 LBS | TEMPERATURE: 98 F | SYSTOLIC BLOOD PRESSURE: 133 MMHG | DIASTOLIC BLOOD PRESSURE: 81 MMHG | HEIGHT: 58 IN | OXYGEN SATURATION: 99 % | HEART RATE: 91 BPM

## 2023-12-19 PROBLEM — I10 ESSENTIAL (PRIMARY) HYPERTENSION: Chronic | Status: ACTIVE | Noted: 2023-12-01

## 2023-12-19 PROBLEM — E78.00 PURE HYPERCHOLESTEROLEMIA, UNSPECIFIED: Chronic | Status: ACTIVE | Noted: 2023-12-01

## 2023-12-19 PROBLEM — E03.9 HYPOTHYROIDISM, UNSPECIFIED: Chronic | Status: ACTIVE | Noted: 2023-12-01

## 2023-12-19 LAB
ALBUMIN SERPL ELPH-MCNC: 3.9 G/DL — SIGNIFICANT CHANGE UP (ref 3.3–5)
ALBUMIN SERPL ELPH-MCNC: 3.9 G/DL — SIGNIFICANT CHANGE UP (ref 3.3–5)
ALP SERPL-CCNC: 91 U/L — SIGNIFICANT CHANGE UP (ref 40–120)
ALP SERPL-CCNC: 91 U/L — SIGNIFICANT CHANGE UP (ref 40–120)
ALT FLD-CCNC: 27 U/L — SIGNIFICANT CHANGE UP (ref 12–78)
ALT FLD-CCNC: 27 U/L — SIGNIFICANT CHANGE UP (ref 12–78)
ANION GAP SERPL CALC-SCNC: 6 MMOL/L — SIGNIFICANT CHANGE UP (ref 5–17)
ANION GAP SERPL CALC-SCNC: 6 MMOL/L — SIGNIFICANT CHANGE UP (ref 5–17)
APPEARANCE UR: CLEAR — SIGNIFICANT CHANGE UP
APPEARANCE UR: CLEAR — SIGNIFICANT CHANGE UP
AST SERPL-CCNC: 21 U/L — SIGNIFICANT CHANGE UP (ref 15–37)
AST SERPL-CCNC: 21 U/L — SIGNIFICANT CHANGE UP (ref 15–37)
BASOPHILS # BLD AUTO: 0.07 K/UL — SIGNIFICANT CHANGE UP (ref 0–0.2)
BASOPHILS # BLD AUTO: 0.07 K/UL — SIGNIFICANT CHANGE UP (ref 0–0.2)
BASOPHILS NFR BLD AUTO: 1 % — SIGNIFICANT CHANGE UP (ref 0–2)
BASOPHILS NFR BLD AUTO: 1 % — SIGNIFICANT CHANGE UP (ref 0–2)
BILIRUB SERPL-MCNC: 0.6 MG/DL — SIGNIFICANT CHANGE UP (ref 0.2–1.2)
BILIRUB SERPL-MCNC: 0.6 MG/DL — SIGNIFICANT CHANGE UP (ref 0.2–1.2)
BILIRUB UR-MCNC: NEGATIVE — SIGNIFICANT CHANGE UP
BILIRUB UR-MCNC: NEGATIVE — SIGNIFICANT CHANGE UP
BUN SERPL-MCNC: 24 MG/DL — HIGH (ref 7–23)
BUN SERPL-MCNC: 24 MG/DL — HIGH (ref 7–23)
CALCIUM SERPL-MCNC: 9.3 MG/DL — SIGNIFICANT CHANGE UP (ref 8.5–10.1)
CALCIUM SERPL-MCNC: 9.3 MG/DL — SIGNIFICANT CHANGE UP (ref 8.5–10.1)
CHLORIDE SERPL-SCNC: 110 MMOL/L — HIGH (ref 96–108)
CHLORIDE SERPL-SCNC: 110 MMOL/L — HIGH (ref 96–108)
CK SERPL-CCNC: 100 U/L — SIGNIFICANT CHANGE UP (ref 26–192)
CK SERPL-CCNC: 100 U/L — SIGNIFICANT CHANGE UP (ref 26–192)
CO2 SERPL-SCNC: 27 MMOL/L — SIGNIFICANT CHANGE UP (ref 22–31)
CO2 SERPL-SCNC: 27 MMOL/L — SIGNIFICANT CHANGE UP (ref 22–31)
COLOR SPEC: YELLOW — SIGNIFICANT CHANGE UP
COLOR SPEC: YELLOW — SIGNIFICANT CHANGE UP
CREAT SERPL-MCNC: 1.16 MG/DL — SIGNIFICANT CHANGE UP (ref 0.5–1.3)
CREAT SERPL-MCNC: 1.16 MG/DL — SIGNIFICANT CHANGE UP (ref 0.5–1.3)
DIFF PNL FLD: NEGATIVE — SIGNIFICANT CHANGE UP
DIFF PNL FLD: NEGATIVE — SIGNIFICANT CHANGE UP
EGFR: 49 ML/MIN/1.73M2 — LOW
EGFR: 49 ML/MIN/1.73M2 — LOW
EOSINOPHIL # BLD AUTO: 0.13 K/UL — SIGNIFICANT CHANGE UP (ref 0–0.5)
EOSINOPHIL # BLD AUTO: 0.13 K/UL — SIGNIFICANT CHANGE UP (ref 0–0.5)
EOSINOPHIL NFR BLD AUTO: 1.9 % — SIGNIFICANT CHANGE UP (ref 0–6)
EOSINOPHIL NFR BLD AUTO: 1.9 % — SIGNIFICANT CHANGE UP (ref 0–6)
GLUCOSE SERPL-MCNC: 97 MG/DL — SIGNIFICANT CHANGE UP (ref 70–99)
GLUCOSE SERPL-MCNC: 97 MG/DL — SIGNIFICANT CHANGE UP (ref 70–99)
GLUCOSE UR QL: NEGATIVE MG/DL — SIGNIFICANT CHANGE UP
GLUCOSE UR QL: NEGATIVE MG/DL — SIGNIFICANT CHANGE UP
HCT VFR BLD CALC: 35.3 % — SIGNIFICANT CHANGE UP (ref 34.5–45)
HCT VFR BLD CALC: 35.3 % — SIGNIFICANT CHANGE UP (ref 34.5–45)
HGB BLD-MCNC: 11.9 G/DL — SIGNIFICANT CHANGE UP (ref 11.5–15.5)
HGB BLD-MCNC: 11.9 G/DL — SIGNIFICANT CHANGE UP (ref 11.5–15.5)
IMM GRANULOCYTES NFR BLD AUTO: 0.3 % — SIGNIFICANT CHANGE UP (ref 0–0.9)
IMM GRANULOCYTES NFR BLD AUTO: 0.3 % — SIGNIFICANT CHANGE UP (ref 0–0.9)
KETONES UR-MCNC: NEGATIVE MG/DL — SIGNIFICANT CHANGE UP
KETONES UR-MCNC: NEGATIVE MG/DL — SIGNIFICANT CHANGE UP
LACTATE SERPL-SCNC: 0.9 MMOL/L — SIGNIFICANT CHANGE UP (ref 0.7–2)
LACTATE SERPL-SCNC: 0.9 MMOL/L — SIGNIFICANT CHANGE UP (ref 0.7–2)
LEUKOCYTE ESTERASE UR-ACNC: NEGATIVE — SIGNIFICANT CHANGE UP
LEUKOCYTE ESTERASE UR-ACNC: NEGATIVE — SIGNIFICANT CHANGE UP
LIDOCAIN IGE QN: 39 U/L — SIGNIFICANT CHANGE UP (ref 13–75)
LIDOCAIN IGE QN: 39 U/L — SIGNIFICANT CHANGE UP (ref 13–75)
LYMPHOCYTES # BLD AUTO: 1.35 K/UL — SIGNIFICANT CHANGE UP (ref 1–3.3)
LYMPHOCYTES # BLD AUTO: 1.35 K/UL — SIGNIFICANT CHANGE UP (ref 1–3.3)
LYMPHOCYTES # BLD AUTO: 19.6 % — SIGNIFICANT CHANGE UP (ref 13–44)
LYMPHOCYTES # BLD AUTO: 19.6 % — SIGNIFICANT CHANGE UP (ref 13–44)
MAGNESIUM SERPL-MCNC: 2.3 MG/DL — SIGNIFICANT CHANGE UP (ref 1.6–2.6)
MAGNESIUM SERPL-MCNC: 2.3 MG/DL — SIGNIFICANT CHANGE UP (ref 1.6–2.6)
MCHC RBC-ENTMCNC: 30.5 PG — SIGNIFICANT CHANGE UP (ref 27–34)
MCHC RBC-ENTMCNC: 30.5 PG — SIGNIFICANT CHANGE UP (ref 27–34)
MCHC RBC-ENTMCNC: 33.7 G/DL — SIGNIFICANT CHANGE UP (ref 32–36)
MCHC RBC-ENTMCNC: 33.7 G/DL — SIGNIFICANT CHANGE UP (ref 32–36)
MCV RBC AUTO: 90.5 FL — SIGNIFICANT CHANGE UP (ref 80–100)
MCV RBC AUTO: 90.5 FL — SIGNIFICANT CHANGE UP (ref 80–100)
MONOCYTES # BLD AUTO: 0.41 K/UL — SIGNIFICANT CHANGE UP (ref 0–0.9)
MONOCYTES # BLD AUTO: 0.41 K/UL — SIGNIFICANT CHANGE UP (ref 0–0.9)
MONOCYTES NFR BLD AUTO: 5.9 % — SIGNIFICANT CHANGE UP (ref 2–14)
MONOCYTES NFR BLD AUTO: 5.9 % — SIGNIFICANT CHANGE UP (ref 2–14)
NEUTROPHILS # BLD AUTO: 4.92 K/UL — SIGNIFICANT CHANGE UP (ref 1.8–7.4)
NEUTROPHILS # BLD AUTO: 4.92 K/UL — SIGNIFICANT CHANGE UP (ref 1.8–7.4)
NEUTROPHILS NFR BLD AUTO: 71.3 % — SIGNIFICANT CHANGE UP (ref 43–77)
NEUTROPHILS NFR BLD AUTO: 71.3 % — SIGNIFICANT CHANGE UP (ref 43–77)
NITRITE UR-MCNC: NEGATIVE — SIGNIFICANT CHANGE UP
NITRITE UR-MCNC: NEGATIVE — SIGNIFICANT CHANGE UP
NRBC # BLD: 0 /100 WBCS — SIGNIFICANT CHANGE UP (ref 0–0)
NRBC # BLD: 0 /100 WBCS — SIGNIFICANT CHANGE UP (ref 0–0)
PH UR: 6.5 — SIGNIFICANT CHANGE UP (ref 5–8)
PH UR: 6.5 — SIGNIFICANT CHANGE UP (ref 5–8)
PHOSPHATE SERPL-MCNC: 4.1 MG/DL — SIGNIFICANT CHANGE UP (ref 2.5–4.5)
PHOSPHATE SERPL-MCNC: 4.1 MG/DL — SIGNIFICANT CHANGE UP (ref 2.5–4.5)
PLATELET # BLD AUTO: 308 K/UL — SIGNIFICANT CHANGE UP (ref 150–400)
PLATELET # BLD AUTO: 308 K/UL — SIGNIFICANT CHANGE UP (ref 150–400)
POTASSIUM SERPL-MCNC: 4.4 MMOL/L — SIGNIFICANT CHANGE UP (ref 3.5–5.3)
POTASSIUM SERPL-MCNC: 4.4 MMOL/L — SIGNIFICANT CHANGE UP (ref 3.5–5.3)
POTASSIUM SERPL-SCNC: 4.4 MMOL/L — SIGNIFICANT CHANGE UP (ref 3.5–5.3)
POTASSIUM SERPL-SCNC: 4.4 MMOL/L — SIGNIFICANT CHANGE UP (ref 3.5–5.3)
PROT SERPL-MCNC: 7.6 GM/DL — SIGNIFICANT CHANGE UP (ref 6–8.3)
PROT SERPL-MCNC: 7.6 GM/DL — SIGNIFICANT CHANGE UP (ref 6–8.3)
PROT UR-MCNC: NEGATIVE MG/DL — SIGNIFICANT CHANGE UP
PROT UR-MCNC: NEGATIVE MG/DL — SIGNIFICANT CHANGE UP
RBC # BLD: 3.9 M/UL — SIGNIFICANT CHANGE UP (ref 3.8–5.2)
RBC # BLD: 3.9 M/UL — SIGNIFICANT CHANGE UP (ref 3.8–5.2)
RBC # FLD: 13.3 % — SIGNIFICANT CHANGE UP (ref 10.3–14.5)
RBC # FLD: 13.3 % — SIGNIFICANT CHANGE UP (ref 10.3–14.5)
SODIUM SERPL-SCNC: 143 MMOL/L — SIGNIFICANT CHANGE UP (ref 135–145)
SODIUM SERPL-SCNC: 143 MMOL/L — SIGNIFICANT CHANGE UP (ref 135–145)
SP GR SPEC: 1.01 — SIGNIFICANT CHANGE UP (ref 1–1.03)
SP GR SPEC: 1.01 — SIGNIFICANT CHANGE UP (ref 1–1.03)
TROPONIN I, HIGH SENSITIVITY RESULT: 5.3 NG/L — SIGNIFICANT CHANGE UP
TROPONIN I, HIGH SENSITIVITY RESULT: 5.3 NG/L — SIGNIFICANT CHANGE UP
UROBILINOGEN FLD QL: 0.2 MG/DL — SIGNIFICANT CHANGE UP (ref 0.2–1)
UROBILINOGEN FLD QL: 0.2 MG/DL — SIGNIFICANT CHANGE UP (ref 0.2–1)
WBC # BLD: 6.9 K/UL — SIGNIFICANT CHANGE UP (ref 3.8–10.5)
WBC # BLD: 6.9 K/UL — SIGNIFICANT CHANGE UP (ref 3.8–10.5)
WBC # FLD AUTO: 6.9 K/UL — SIGNIFICANT CHANGE UP (ref 3.8–10.5)
WBC # FLD AUTO: 6.9 K/UL — SIGNIFICANT CHANGE UP (ref 3.8–10.5)

## 2023-12-19 PROCEDURE — 99222 1ST HOSP IP/OBS MODERATE 55: CPT

## 2023-12-19 PROCEDURE — 70498 CT ANGIOGRAPHY NECK: CPT | Mod: 26,MA

## 2023-12-19 PROCEDURE — 72125 CT NECK SPINE W/O DYE: CPT | Mod: 26

## 2023-12-19 PROCEDURE — 70450 CT HEAD/BRAIN W/O DYE: CPT | Mod: 26,XU,MA

## 2023-12-19 PROCEDURE — 93010 ELECTROCARDIOGRAM REPORT: CPT

## 2023-12-19 PROCEDURE — 72131 CT LUMBAR SPINE W/O DYE: CPT | Mod: 26

## 2023-12-19 PROCEDURE — 99285 EMERGENCY DEPT VISIT HI MDM: CPT

## 2023-12-19 PROCEDURE — 93971 EXTREMITY STUDY: CPT | Mod: 26,LT

## 2023-12-19 PROCEDURE — 70496 CT ANGIOGRAPHY HEAD: CPT | Mod: 26,MA

## 2023-12-19 RX ORDER — ATORVASTATIN CALCIUM 80 MG/1
80 TABLET, FILM COATED ORAL AT BEDTIME
Refills: 0 | Status: DISCONTINUED | OUTPATIENT
Start: 2023-12-19 | End: 2023-12-22

## 2023-12-19 RX ORDER — ACETAMINOPHEN 500 MG
1000 TABLET ORAL ONCE
Refills: 0 | Status: COMPLETED | OUTPATIENT
Start: 2023-12-19 | End: 2023-12-19

## 2023-12-19 RX ORDER — ENOXAPARIN SODIUM 100 MG/ML
40 INJECTION SUBCUTANEOUS EVERY 12 HOURS
Refills: 0 | Status: DISCONTINUED | OUTPATIENT
Start: 2023-12-19 | End: 2023-12-22

## 2023-12-19 RX ORDER — LISINOPRIL 2.5 MG/1
40 TABLET ORAL DAILY
Refills: 0 | Status: DISCONTINUED | OUTPATIENT
Start: 2023-12-19 | End: 2023-12-22

## 2023-12-19 RX ORDER — PANTOPRAZOLE SODIUM 20 MG/1
40 TABLET, DELAYED RELEASE ORAL
Refills: 0 | Status: DISCONTINUED | OUTPATIENT
Start: 2023-12-19 | End: 2023-12-22

## 2023-12-19 RX ORDER — AMLODIPINE BESYLATE 2.5 MG/1
10 TABLET ORAL DAILY
Refills: 0 | Status: DISCONTINUED | OUTPATIENT
Start: 2023-12-19 | End: 2023-12-22

## 2023-12-19 RX ORDER — ASPIRIN/CALCIUM CARB/MAGNESIUM 324 MG
81 TABLET ORAL DAILY
Refills: 0 | Status: DISCONTINUED | OUTPATIENT
Start: 2023-12-19 | End: 2023-12-22

## 2023-12-19 RX ORDER — KETOROLAC TROMETHAMINE 30 MG/ML
30 SYRINGE (ML) INJECTION ONCE
Refills: 0 | Status: DISCONTINUED | OUTPATIENT
Start: 2023-12-19 | End: 2023-12-19

## 2023-12-19 RX ORDER — SODIUM CHLORIDE 9 MG/ML
1000 INJECTION INTRAMUSCULAR; INTRAVENOUS; SUBCUTANEOUS ONCE
Refills: 0 | Status: COMPLETED | OUTPATIENT
Start: 2023-12-19 | End: 2023-12-19

## 2023-12-19 RX ORDER — LANOLIN ALCOHOL/MO/W.PET/CERES
3 CREAM (GRAM) TOPICAL AT BEDTIME
Refills: 0 | Status: DISCONTINUED | OUTPATIENT
Start: 2023-12-19 | End: 2023-12-22

## 2023-12-19 RX ORDER — CYCLOBENZAPRINE HYDROCHLORIDE 10 MG/1
5 TABLET, FILM COATED ORAL THREE TIMES A DAY
Refills: 0 | Status: DISCONTINUED | OUTPATIENT
Start: 2023-12-19 | End: 2023-12-22

## 2023-12-19 RX ORDER — ACETAMINOPHEN 500 MG
650 TABLET ORAL EVERY 6 HOURS
Refills: 0 | Status: DISCONTINUED | OUTPATIENT
Start: 2023-12-19 | End: 2023-12-22

## 2023-12-19 RX ORDER — KETOROLAC TROMETHAMINE 30 MG/ML
15 SYRINGE (ML) INJECTION EVERY 6 HOURS
Refills: 0 | Status: DISCONTINUED | OUTPATIENT
Start: 2023-12-19 | End: 2023-12-22

## 2023-12-19 RX ORDER — LEVOTHYROXINE SODIUM 125 MCG
25 TABLET ORAL DAILY
Refills: 0 | Status: DISCONTINUED | OUTPATIENT
Start: 2023-12-19 | End: 2023-12-22

## 2023-12-19 RX ORDER — ONDANSETRON 8 MG/1
4 TABLET, FILM COATED ORAL EVERY 8 HOURS
Refills: 0 | Status: DISCONTINUED | OUTPATIENT
Start: 2023-12-19 | End: 2023-12-22

## 2023-12-19 RX ORDER — METHOCARBAMOL 500 MG/1
500 TABLET, FILM COATED ORAL ONCE
Refills: 0 | Status: COMPLETED | OUTPATIENT
Start: 2023-12-19 | End: 2023-12-19

## 2023-12-19 RX ADMIN — METHOCARBAMOL 500 MILLIGRAM(S): 500 TABLET, FILM COATED ORAL at 12:29

## 2023-12-19 RX ADMIN — SODIUM CHLORIDE 1000 MILLILITER(S): 9 INJECTION INTRAMUSCULAR; INTRAVENOUS; SUBCUTANEOUS at 10:25

## 2023-12-19 RX ADMIN — CYCLOBENZAPRINE HYDROCHLORIDE 5 MILLIGRAM(S): 10 TABLET, FILM COATED ORAL at 21:39

## 2023-12-19 RX ADMIN — Medication 1000 MILLIGRAM(S): at 10:39

## 2023-12-19 RX ADMIN — Medication 30 MILLIGRAM(S): at 17:47

## 2023-12-19 RX ADMIN — Medication 400 MILLIGRAM(S): at 10:24

## 2023-12-19 NOTE — ED PROVIDER NOTE - OBJECTIVE STATEMENT
74 y/o female with htn, hld, hypothyroid 72 y/o female with htn, hld, hypothyroid here with a CC of LLE weakness. She also states that she has had numbness to her b/l hands. Patient states that these symptoms started 1 week ago. The patient states that she had an episode of what she presumed was sciatica one week ago however after the pain from that episode resolved she noticed that she had weakness in her LLE and has required a cane to ambulate for 2 days. Patient denies numbness or change of sensation to lower extremities, denies saddle anesthesia, denies any urinary or fecal incontinence. Patient denies any recent trauma.

## 2023-12-19 NOTE — H&P ADULT - NSHPLABSRESULTS_GEN_ALL_CORE
T(C): 36.6 (23 @ 15:20), Max: 36.6 (23 @ 15:20)  HR: 87 (23 @ 15:20) (82 - 91)  BP: 143/91 (23 @ 15:20) (113/72 - 143/91)  RR: 15 (23 @ 15:20) (15 - 19)  SpO2: 96% (23 @ 15:20) (96% - 99%)                        11.9   6.90  )-----------( 308      ( 19 Dec 2023 09:30 )             35.3         143  |  110<H>  |  24<H>  ----------------------------<  97  4.4   |  27  |  1.16    Ca    9.3      19 Dec 2023 09:30  Phos  4.1       Mg     2.3         TPro  7.6  /  Alb  3.9  /  TBili  0.6  /  DBili  x   /  AST  21  /  ALT  27  /  AlkPhos  91      LIVER FUNCTIONS - ( 19 Dec 2023 09:30 )  Alb: 3.9 g/dL / Pro: 7.6 gm/dL / ALK PHOS: 91 U/L / ALT: 27 U/L / AST: 21 U/L / GGT: x             Urinalysis Basic - ( 19 Dec 2023 11:20 )    Color: Yellow / Appearance: Clear / S.008 / pH: x  Gluc: x / Ketone: Negative mg/dL  / Bili: Negative / Urobili: 0.2 mg/dL   Blood: x / Protein: Negative mg/dL / Nitrite: Negative   Leuk Esterase: Negative / RBC: x / WBC x   Sq Epi: x / Non Sq Epi: x / Bacteria: x    < from: CT Head No Cont (23 @ 11:39) >    IMPRESSION:    CT HEAD:  1.  No evidence of acute intracranial hemorrhage or midline shift.  2.  Chronic small vessel disease. If there is continued concern for acute   neurologic compromise, recommend MRI of the brain for further evaluation.    CTA BRAIN:  1.  No evidence of intracranial aneurysm, critical stenosis, or abrupt   cut off of intraluminal contrast.    CTA NECK:  1.  No evidence of critical stenosis by NASCET criteria.  2.  Soft tissue fullness to the left vocal fold. Recommend direct visual   inspection.    --- End of Report ---    < end of copied text >          acetaminophen     Tablet .. 650 milliGRAM(s) Oral every 6 hours PRN  aluminum hydroxide/magnesium hydroxide/simethicone Suspension 30 milliLiter(s) Oral every 4 hours PRN  amLODIPine   Tablet 10 milliGRAM(s) Oral daily  aspirin enteric coated 81 milliGRAM(s) Oral daily  atorvastatin 80 milliGRAM(s) Oral at bedtime  enoxaparin Injectable 40 milliGRAM(s) SubCutaneous every 12 hours  ketorolac   Injectable 30 milliGRAM(s) IV Push once  levothyroxine 25 MICROGram(s) Oral daily  lisinopril 40 milliGRAM(s) Oral daily  melatonin 3 milliGRAM(s) Oral at bedtime PRN  ondansetron Injectable 4 milliGRAM(s) IV Push every 8 hours PRN  pantoprazole    Tablet 40 milliGRAM(s) Oral before breakfast T(C): 36.6 (23 @ 15:20), Max: 36.6 (23 @ 15:20)  HR: 87 (23 @ 15:20) (82 - 91)  BP: 143/91 (23 @ 15:20) (113/72 - 143/91)  RR: 15 (23 @ 15:20) (15 - 19)  SpO2: 96% (23 @ 15:20) (96% - 99%)                        11.9   6.90  )-----------( 308      ( 19 Dec 2023 09:30 )             35.3         143  |  110<H>  |  24<H>  ----------------------------<  97  4.4   |  27  |  1.16    Ca    9.3      19 Dec 2023 09:30  Phos  4.1       Mg     2.3         TPro  7.6  /  Alb  3.9  /  TBili  0.6  /  DBili  x   /  AST  21  /  ALT  27  /  AlkPhos  91      LIVER FUNCTIONS - ( 19 Dec 2023 09:30 )  Alb: 3.9 g/dL / Pro: 7.6 gm/dL / ALK PHOS: 91 U/L / ALT: 27 U/L / AST: 21 U/L / GGT: x             Urinalysis Basic - ( 19 Dec 2023 11:20 )    Color: Yellow / Appearance: Clear / S.008 / pH: x  Gluc: x / Ketone: Negative mg/dL  / Bili: Negative / Urobili: 0.2 mg/dL   Blood: x / Protein: Negative mg/dL / Nitrite: Negative   Leuk Esterase: Negative / RBC: x / WBC x   Sq Epi: x / Non Sq Epi: x / Bacteria: x    < from: CT Head No Cont (23 @ 11:39) >    IMPRESSION:    CT HEAD:  1.  No evidence of acute intracranial hemorrhage or midline shift.  2.  Chronic small vessel disease. If there is continued concern for acute   neurologic compromise, recommend MRI of the brain for further evaluation.    CTA BRAIN:  1.  No evidence of intracranial aneurysm, critical stenosis, or abrupt   cut off of intraluminal contrast.    CTA NECK:  1.  No evidence of critical stenosis by NASCET criteria.  2.  Soft tissue fullness to the left vocal fold. Recommend direct visual   inspection.    --- End of Report ---    < end of copied text >    EKG - NSR, T wave Iv in III, aVL old      acetaminophen     Tablet .. 650 milliGRAM(s) Oral every 6 hours PRN  aluminum hydroxide/magnesium hydroxide/simethicone Suspension 30 milliLiter(s) Oral every 4 hours PRN  amLODIPine   Tablet 10 milliGRAM(s) Oral daily  aspirin enteric coated 81 milliGRAM(s) Oral daily  atorvastatin 80 milliGRAM(s) Oral at bedtime  enoxaparin Injectable 40 milliGRAM(s) SubCutaneous every 12 hours  ketorolac   Injectable 30 milliGRAM(s) IV Push once  levothyroxine 25 MICROGram(s) Oral daily  lisinopril 40 milliGRAM(s) Oral daily  melatonin 3 milliGRAM(s) Oral at bedtime PRN  ondansetron Injectable 4 milliGRAM(s) IV Push every 8 hours PRN  pantoprazole    Tablet 40 milliGRAM(s) Oral before breakfast

## 2023-12-19 NOTE — ED PROVIDER NOTE - CLINICAL SUMMARY MEDICAL DECISION MAKING FREE TEXT BOX
HPI and PMH as above  Patient presenting with new LLE for one week  CT with no new changes  Possibly secondary to sciatica however no pain  No trauma or pain to midline back  No saddle anesthesia  No urinary incontinence  Will admit for further workup HPI and PMH as above  Patient presenting with new LLE for one week  CT with no new changes  Possibly secondary to sciatica however no pain  No trauma or pain to midline back  No saddle anesthesia  No urinary incontinence  Lower suspicion for acute cord compression however CT imaging done to assess for signs of trauma  Patient outside the CVA activation window    CT of lumbar spine does show stenosis consistent with her hx of sciatica. CT of cervical spine shows degenerative changes which could explain paresthesias in upper extremities             Will admit for further workup

## 2023-12-19 NOTE — H&P ADULT - ASSESSMENT
72 y/o Morbidly obese F w/ PMHx of HTN, HLD, GERD, Hypothyroid presents to the ED c/o LLE weakness and b/l hand numbness. Pt being admitted for further eval.    # LLE weakness and b/l hand numbness.  - Low susp for CVA at this time. Likely LE related to lumbar process and hand numbness related to cervical process  - f/u CT C and L spine  - PT eval  - fall precautions    # R cervicalgia  - Warm compress  - tylenol, flexeril prn    # HTN         74 y/o Morbidly obese F w/ PMHx of HTN, HLD, GERD, Hypothyroid presents to the ED c/o LLE weakness and b/l hand numbness. Pt being admitted for further eval.    # LLE weakness and b/l hand numbness.  - Low susp for TIA/CVA at this time. Likely LE related to lumbar process and hand numbness related to cervical process  - f/u CT C and L spine, if unremarkable would obtain MRI Brain  - PT eval  - fall precautions    # R cervicalgia  - Warm compress  - tylenol, flexeril prn    # HTN  - c/w Amlodipine, lisinopril    # Hypothyroidism  - c/w synthroid    # DVT ppx - Lovenox subq         72 y/o Morbidly obese F w/ PMHx of HTN, HLD, GERD, Hypothyroid presents to the ED c/o LLE weakness and b/l hand numbness. Pt being admitted for further eval.    # LLE weakness and b/l hand numbness.  - Low susp for TIA/CVA at this time. Likely LE related to lumbar process and hand numbness related to cervical process  - f/u CT C and L spine, if unremarkable would obtain MRI Brain  - PT eval  - fall precautions    # R cervicalgia  - Warm compress  - tylenol, flexeril prn    # HTN  - c/w Amlodipine, lisinopril    # Hypothyroidism  - c/w synthroid    # DVT ppx - Lovenox subq

## 2023-12-19 NOTE — H&P ADULT - HISTORY OF PRESENT ILLNESS
72 y/o Morbidly obese F w/ PMHx of HTN, HLD, GERD, Hypothyroid presents to the ED c/o LLE weakness and b/l hand numbness        72 y/o Morbidly obese F w/ PMHx of HTN, HLD, GERD, Hypothyroid presents to the ED c/o LLE weakness and b/l hand numbness. Pt reports the week prior she had sciatica which has since resolved however after the pain went away she noted her LLE was weak which never happened in the past when she had sciatica before. Pt typically ambulates on her own however in the last 2 days has had to use a cane. Pt also reports this morning she woke up w/ b/l hand numbness and tingling as well and R sided neck pain. pt denies fall or trauma to neck or back. Pt denies bowel or bladder incontinence or saddle anesthesia.      Of note pt came in to the ED w/ L arm numbness on Nov 29th, ED work up was neg and pt told to follow up w/ neurology however she did not.     In ED initial vitals stable. Labs stable. Pt underwent CT head and Angio for presumed possible CVA which were unremarkable

## 2023-12-19 NOTE — ED ADULT NURSE NOTE - ED STAT RN HANDOFF DETAILS
Pt taken up to 2C Nursing Manager. Report not given to specific nurse. Pt taken up to 2C Nursing Manager. Report given to Marisol CORRALES.

## 2023-12-19 NOTE — ED ADULT NURSE NOTE - OBJECTIVE STATEMENT
pt c/o left leg numbness x 1 week, right neck numbness started today. pt also c/o intermittent left ankle swelling x 2 weeks. seen in ED for colitis, dehydration x 2 weeks ago

## 2023-12-19 NOTE — H&P ADULT - NSHPPHYSICALEXAM_GEN_ALL_CORE
I called and discussed SNAP sleep study results with patient. The patient has mild MAGDA. She is in the process of pursuing bariatric surgery. Patient wishes to hold of treatment at this time. I told the patient she can follow up with me  anytime and per the instructions of her bariatric care team. If the patient changes her mind about treatment she will call our office. Given that the patient is pending bariatric surgery, PAP therapy would be the best option. Would not recommend an oral appliance at this time.     Gloria Fine DO, [facility]    [facility]  Pulmonary, Critical Care, and Sleep Medicine PHYSICAL EXAM:    Vital Signs Last 24 Hrs  T(C): 36.6 (19 Dec 2023 15:20), Max: 36.6 (19 Dec 2023 15:20)  T(F): 97.9 (19 Dec 2023 15:20), Max: 97.9 (19 Dec 2023 15:20)  HR: 87 (19 Dec 2023 15:20) (82 - 91)  BP: 143/91 (19 Dec 2023 15:20) (113/72 - 143/91)  BP(mean): --  RR: 15 (19 Dec 2023 15:20) (15 - 19)  SpO2: 96% (19 Dec 2023 15:20) (96% - 99%)    Parameters below as of 19 Dec 2023 15:20  Patient On (Oxygen Delivery Method): room air        GENERAL: Pt lying in bed comfortably in NAD  HEENT:  Atraumatic, EOMI, PERRL, conjunctiva and sclera clear, MMM  NECK: Supple, No JVD  CHEST/LUNG: Clear to auscultation bilaterally; No rales, rhonchi, wheezing or rubs. Unlabored respirations  HEART: Regular rate and rhythm; No murmurs, rubs, or gallops  ABDOMEN: Bowel sounds present; Soft, Nontender, Nondistended. No guarding or rigidity    EXTREMITIES:  2+ Peripheral Pulses, brisk capillary refill. No clubbing, cyanosis, or edema  NEUROLOGICAL:  Alert & Oriented X3, speech clear. Answers questions appropriately. Full and equal strength B/L upper and lower extremities. No deficits   MSK: FROM x 4 extremities   SKIN: No rashes or lesions PHYSICAL EXAM:    Vital Signs Last 24 Hrs  T(C): 36.6 (19 Dec 2023 15:20), Max: 36.6 (19 Dec 2023 15:20)  T(F): 97.9 (19 Dec 2023 15:20), Max: 97.9 (19 Dec 2023 15:20)  HR: 87 (19 Dec 2023 15:20) (82 - 91)  BP: 143/91 (19 Dec 2023 15:20) (113/72 - 143/91)  BP(mean): --  RR: 15 (19 Dec 2023 15:20) (15 - 19)  SpO2: 96% (19 Dec 2023 15:20) (96% - 99%)    Parameters below as of 19 Dec 2023 15:20  Patient On (Oxygen Delivery Method): room air        GENERAL: Pt lying in bed comfortably in NAD  HEENT:  Atraumatic, EOMI, PERRL, conjunctiva and sclera clear, MMM  NECK: R suprascapular spasm, tender to palp. No spinal tenderness  CHEST/LUNG: Clear to auscultation bilaterally  HEART: Regular rate and rhythm  ABDOMEN: Bowel sounds present; Soft, Nontender, Nondistended.   BACK - L spinal and paraspinal tenderness  EXTREMITIES:  2+ Peripheral Pulses, brisk capillary refill. No edema  NEUROLOGICAL:  Alert & Oriented X3, speech clear. Answers questions appropriately. CN II - XII intact. Full and equal strength B/L upper extremities. LLE 4-/5. Neg straight leg raise  MSK: FROM x 4 extremities   SKIN: warm dry

## 2023-12-19 NOTE — ED ADULT NURSE NOTE - NSFALLRISKINTERV_ED_ALL_ED
Assistance OOB with selected safe patient handling equipment if applicable/Assistance with ambulation/Communicate fall risk and risk factors to all staff, patient, and family/Monitor gait and stability/Provide visual cue: yellow wristband, yellow gown, etc/Reinforce activity limits and safety measures with patient and family/Call bell, personal items and telephone in reach/Instruct patient to call for assistance before getting out of bed/chair/stretcher/Non-slip footwear applied when patient is off stretcher/[place] to call system/Physically safe environment - no spills, clutter or unnecessary equipment/Purposeful Proactive Rounding/Room/bathroom lighting operational, light cord in reach Assistance OOB with selected safe patient handling equipment if applicable/Assistance with ambulation/Communicate fall risk and risk factors to all staff, patient, and family/Monitor gait and stability/Provide visual cue: yellow wristband, yellow gown, etc/Reinforce activity limits and safety measures with patient and family/Call bell, personal items and telephone in reach/Instruct patient to call for assistance before getting out of bed/chair/stretcher/Non-slip footwear applied when patient is off stretcher/[place] to call system/Physically safe environment - no spills, clutter or unnecessary equipment/Purposeful Proactive Rounding/Room/bathroom lighting operational, light cord in reach

## 2023-12-19 NOTE — ED PROVIDER NOTE - PHYSICAL EXAMINATION
Neuro  No change in sensation or strength of face  No facial droop  No slurred speech  Normal 5/5 strength in upper extremities  Normal sensation noted in upper extremities  (+) 4/5 strength noted in LLE, normal sensation b/l lower extremities  No midline spinal tenderness

## 2023-12-19 NOTE — ED ADULT TRIAGE NOTE - CHIEF COMPLAINT QUOTE
pt states she woke up with neck pain and right shoulder numbness and numbness of b/L hands. pt also c/o left leg calf swelling and weakness for a week.  pt negative on the stroke scale. history of htn, high cholesterol and GERD.

## 2023-12-20 LAB
ANION GAP SERPL CALC-SCNC: 6 MMOL/L — SIGNIFICANT CHANGE UP (ref 5–17)
ANION GAP SERPL CALC-SCNC: 6 MMOL/L — SIGNIFICANT CHANGE UP (ref 5–17)
BUN SERPL-MCNC: 22 MG/DL — SIGNIFICANT CHANGE UP (ref 7–23)
BUN SERPL-MCNC: 22 MG/DL — SIGNIFICANT CHANGE UP (ref 7–23)
CALCIUM SERPL-MCNC: 8.9 MG/DL — SIGNIFICANT CHANGE UP (ref 8.5–10.1)
CALCIUM SERPL-MCNC: 8.9 MG/DL — SIGNIFICANT CHANGE UP (ref 8.5–10.1)
CHLORIDE SERPL-SCNC: 112 MMOL/L — HIGH (ref 96–108)
CHLORIDE SERPL-SCNC: 112 MMOL/L — HIGH (ref 96–108)
CHOLEST SERPL-MCNC: 139 MG/DL — SIGNIFICANT CHANGE UP
CHOLEST SERPL-MCNC: 139 MG/DL — SIGNIFICANT CHANGE UP
CO2 SERPL-SCNC: 25 MMOL/L — SIGNIFICANT CHANGE UP (ref 22–31)
CO2 SERPL-SCNC: 25 MMOL/L — SIGNIFICANT CHANGE UP (ref 22–31)
CREAT SERPL-MCNC: 1.09 MG/DL — SIGNIFICANT CHANGE UP (ref 0.5–1.3)
CREAT SERPL-MCNC: 1.09 MG/DL — SIGNIFICANT CHANGE UP (ref 0.5–1.3)
EGFR: 53 ML/MIN/1.73M2 — LOW
EGFR: 53 ML/MIN/1.73M2 — LOW
GLUCOSE SERPL-MCNC: 92 MG/DL — SIGNIFICANT CHANGE UP (ref 70–99)
GLUCOSE SERPL-MCNC: 92 MG/DL — SIGNIFICANT CHANGE UP (ref 70–99)
HCT VFR BLD CALC: 31.7 % — LOW (ref 34.5–45)
HCT VFR BLD CALC: 31.7 % — LOW (ref 34.5–45)
HCV AB S/CO SERPL IA: 0.08 S/CO — SIGNIFICANT CHANGE UP (ref 0–0.99)
HCV AB S/CO SERPL IA: 0.08 S/CO — SIGNIFICANT CHANGE UP (ref 0–0.99)
HCV AB SERPL-IMP: SIGNIFICANT CHANGE UP
HCV AB SERPL-IMP: SIGNIFICANT CHANGE UP
HDLC SERPL-MCNC: 45 MG/DL — LOW
HDLC SERPL-MCNC: 45 MG/DL — LOW
HGB BLD-MCNC: 10.3 G/DL — LOW (ref 11.5–15.5)
HGB BLD-MCNC: 10.3 G/DL — LOW (ref 11.5–15.5)
LIPID PNL WITH DIRECT LDL SERPL: 76 MG/DL — SIGNIFICANT CHANGE UP
LIPID PNL WITH DIRECT LDL SERPL: 76 MG/DL — SIGNIFICANT CHANGE UP
MCHC RBC-ENTMCNC: 29.7 PG — SIGNIFICANT CHANGE UP (ref 27–34)
MCHC RBC-ENTMCNC: 29.7 PG — SIGNIFICANT CHANGE UP (ref 27–34)
MCHC RBC-ENTMCNC: 32.5 G/DL — SIGNIFICANT CHANGE UP (ref 32–36)
MCHC RBC-ENTMCNC: 32.5 G/DL — SIGNIFICANT CHANGE UP (ref 32–36)
MCV RBC AUTO: 91.4 FL — SIGNIFICANT CHANGE UP (ref 80–100)
MCV RBC AUTO: 91.4 FL — SIGNIFICANT CHANGE UP (ref 80–100)
NON HDL CHOLESTEROL: 94 MG/DL — SIGNIFICANT CHANGE UP
NON HDL CHOLESTEROL: 94 MG/DL — SIGNIFICANT CHANGE UP
NRBC # BLD: 0 /100 WBCS — SIGNIFICANT CHANGE UP (ref 0–0)
NRBC # BLD: 0 /100 WBCS — SIGNIFICANT CHANGE UP (ref 0–0)
PLATELET # BLD AUTO: 274 K/UL — SIGNIFICANT CHANGE UP (ref 150–400)
PLATELET # BLD AUTO: 274 K/UL — SIGNIFICANT CHANGE UP (ref 150–400)
POTASSIUM SERPL-MCNC: 3.7 MMOL/L — SIGNIFICANT CHANGE UP (ref 3.5–5.3)
POTASSIUM SERPL-MCNC: 3.7 MMOL/L — SIGNIFICANT CHANGE UP (ref 3.5–5.3)
POTASSIUM SERPL-SCNC: 3.7 MMOL/L — SIGNIFICANT CHANGE UP (ref 3.5–5.3)
POTASSIUM SERPL-SCNC: 3.7 MMOL/L — SIGNIFICANT CHANGE UP (ref 3.5–5.3)
RBC # BLD: 3.47 M/UL — LOW (ref 3.8–5.2)
RBC # BLD: 3.47 M/UL — LOW (ref 3.8–5.2)
RBC # FLD: 13.4 % — SIGNIFICANT CHANGE UP (ref 10.3–14.5)
RBC # FLD: 13.4 % — SIGNIFICANT CHANGE UP (ref 10.3–14.5)
SODIUM SERPL-SCNC: 143 MMOL/L — SIGNIFICANT CHANGE UP (ref 135–145)
SODIUM SERPL-SCNC: 143 MMOL/L — SIGNIFICANT CHANGE UP (ref 135–145)
TRIGL SERPL-MCNC: 96 MG/DL — SIGNIFICANT CHANGE UP
TRIGL SERPL-MCNC: 96 MG/DL — SIGNIFICANT CHANGE UP
WBC # BLD: 7.18 K/UL — SIGNIFICANT CHANGE UP (ref 3.8–10.5)
WBC # BLD: 7.18 K/UL — SIGNIFICANT CHANGE UP (ref 3.8–10.5)
WBC # FLD AUTO: 7.18 K/UL — SIGNIFICANT CHANGE UP (ref 3.8–10.5)
WBC # FLD AUTO: 7.18 K/UL — SIGNIFICANT CHANGE UP (ref 3.8–10.5)

## 2023-12-20 PROCEDURE — 99232 SBSQ HOSP IP/OBS MODERATE 35: CPT

## 2023-12-20 PROCEDURE — 72100 X-RAY EXAM L-S SPINE 2/3 VWS: CPT | Mod: 26

## 2023-12-20 PROCEDURE — 72040 X-RAY EXAM NECK SPINE 2-3 VW: CPT | Mod: 26

## 2023-12-20 PROCEDURE — 72070 X-RAY EXAM THORAC SPINE 2VWS: CPT | Mod: 26

## 2023-12-20 PROCEDURE — 70551 MRI BRAIN STEM W/O DYE: CPT | Mod: 26

## 2023-12-20 PROCEDURE — 72158 MRI LUMBAR SPINE W/O & W/DYE: CPT | Mod: 26

## 2023-12-20 RX ADMIN — LISINOPRIL 40 MILLIGRAM(S): 2.5 TABLET ORAL at 05:42

## 2023-12-20 RX ADMIN — AMLODIPINE BESYLATE 10 MILLIGRAM(S): 2.5 TABLET ORAL at 05:41

## 2023-12-20 RX ADMIN — CYCLOBENZAPRINE HYDROCHLORIDE 5 MILLIGRAM(S): 10 TABLET, FILM COATED ORAL at 20:46

## 2023-12-20 RX ADMIN — ATORVASTATIN CALCIUM 80 MILLIGRAM(S): 80 TABLET, FILM COATED ORAL at 21:52

## 2023-12-20 RX ADMIN — CYCLOBENZAPRINE HYDROCHLORIDE 5 MILLIGRAM(S): 10 TABLET, FILM COATED ORAL at 03:27

## 2023-12-20 RX ADMIN — PANTOPRAZOLE SODIUM 40 MILLIGRAM(S): 20 TABLET, DELAYED RELEASE ORAL at 05:44

## 2023-12-20 RX ADMIN — Medication 81 MILLIGRAM(S): at 12:34

## 2023-12-20 RX ADMIN — CYCLOBENZAPRINE HYDROCHLORIDE 5 MILLIGRAM(S): 10 TABLET, FILM COATED ORAL at 12:34

## 2023-12-20 RX ADMIN — Medication 650 MILLIGRAM(S): at 09:49

## 2023-12-20 RX ADMIN — Medication 25 MICROGRAM(S): at 05:41

## 2023-12-20 NOTE — PATIENT PROFILE ADULT - NSFALLSECTIONLABEL_GEN_A_CORE
INTERVAL EVENTS:  Mom reports that last episode of abd pain and diarrhea was on Saturday at 10pm at home.  Had come to the ED that night and since then, has had no more episodes of abd pain or diarrhea.  Has tolerated a full diet since admission, and had a breakfast of eggs, yogurt, etc. this AM.  No emesis, no nausea.  No fever.    MEDICATIONS  (STANDING):  dextrose 5% + sodium chloride 0.9% with potassium chloride 20 mEq/L. - Pediatric 1000milliLiter(s) IV Continuous <Continuous>  fluticasone / salmeterol 100-50 MICROgram(s) Diskus - Peds 1Dose(s) Inhalation at bedtime    MEDICATIONS  (PRN):  ALBUTerol  90 MICROgram(s) HFA Inhaler - Peds 4Puff(s) Inhalation every 4 hours PRN Shortness of Breath and/or Wheezing    VITAL SIGNS OVER LAST 24 HOURS:  T(C): 37.1, Max: 37.1 (01-09 @ 10:27)  T(F): 98.7, Max: 98.7 (01-09 @ 10:27)  HR: 111 (100 - 111)  BP: 85/55 (85/55 - 104/50)  BP(mean): --  RR: 22 (22 - 24)  SpO2: 99% (98% - 100%)    PHYSICAL EXAM:  Gen - NAD, comfortable, well-appearing, laying in bed and very pleasant on exam  HEENT - NC/AT, moist lips and MMM, no nasal congestion, no rhinorrhea, no conjunctival injection  Neck - supple without SAMANTHA, FROM  CV - RRR, nml S1S2, no murmur noted; <2 sec CR in fingers/toes  Lungs - CTAB with nml WOB, no wheezing or cough noted  Abd - hyperactive BS, slightly distended but soft throughout, tympanitic to percussion, no tenderness upon light or deep palpation  Ext - WWP  Skin - no rashes noted  Neuro - grossly nonfocal, good tone and strength    CBC Full  -  ( 08 Jan 2017 00:05 )  WBC Count : 16.16 K/uL  Hemoglobin : 14.1 g/dL  Hematocrit : 39.1 %  Platelet Count - Automated : 403 K/uL  Mean Cell Volume : 75.5 fL  Mean Cell Hemoglobin : 27.2 pg  Mean Cell Hemoglobin Concentration : 36.1 %  Auto Neutrophil # : 9.87 K/uL  Auto Lymphocyte # : 3.90 K/uL  Auto Monocyte # : 1.17 K/uL  Auto Eosinophil # : 0.98 K/uL  Auto Basophil # : 0.10 K/uL  Auto Neutrophil % : 61.1 %  Auto Lymphocyte % : 24.1 %  Auto Monocyte % : 7.2 %  Auto Eosinophil % : 6.1 %  Auto Basophil % : 0.6 %    08 Jan 2017 00:05    136    |  102    |  8      ----------------------------<  92     3.5     |  14     |  0.38     Ca    9.2        08 Jan 2017 00:05    ASSESSMENT & PLAN:  This is a 5y3m Male with mild-persistent asthma (no active issues) with 2-wk history of intermittent fever, abd pain, vomiting, and diarrhea, multiple ED visits (no PMD visits per Mom), prior CBC notable for 19% bandemia s/p CTX x2 doses, and imaging significant for dilated colon loops on AXR with follow up abd CT showing colitis and mesenteric lymphadenitis.  Generally has been asymptomatic since admission (no diarrhea, no emesis, no pain, no fever) and tolerating a full diet.  Continues to have some abd distention and hyperactive BS on PE.  1) COLITIS WITH MESENTERIC LYMPHADENITIS - improving symptoms of acute colitis/acute infection, though now Mom is concerned that he has not had BM x 36hrs.  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  Surgery consult appreciated - will ask them if there is any utility from their standpoint for repeat AXR (no indication on my end).  Continue supportive care and if he has any more stool output, would send for Cdiff and another stool culture.  2) NUTRITION/HYDRATION - stop IVF now and continue strict I/O's  3) ACCESS - PIV in place  4) DISPO - possibly home today with PMD follow up if able to continue to stay hydrated on his own and no further clinical worsening.  Will touch base with surgery team about need for re-imaging.    I called Dr. Kent's [place] and [place] offices to discuss case-no answer; left voicemail for call back.    --  [x ] I reviewed lab results  [x ] I reviewed radiology results  [x ] I spoke with parents/guardian  [ ] I spoke with consultant    ANTICIPATE DISCHARGE DATE: __today or tomorrow___  [ ] Social Work needs:  [ ] Case management needs:  [ ] Other discharge needs:    Family Centered Rounds completed with: charge RN, bedside RN, Mom/patient, resident team, KEO Fried     [x ] 35 minutes or more was spent on the total encounter with more than 50% of the visit spent on counseling and / or coordination of care    Michael Nicole MD  Pediatric Hospitalist  #[id number]
.

## 2023-12-20 NOTE — PATIENT PROFILE ADULT - FUNCTIONAL ASSESSMENT - BASIC MOBILITY 6.
3-calculated by average/Not able to assess (calculate score using [facility] averaging method)  3-calculated by average/Not able to assess (calculate score using [facility] averaging method)

## 2023-12-20 NOTE — PROGRESS NOTE ADULT - ASSESSMENT
72 y/o Morbidly obese F w/ PMHx of HTN, HLD, GERD, Hypothyroid presents to the ED c/o LLE weakness and b/l hand numbness w/ R sided neck pain.      LLE weakness and b/l hand numbness.  - Low susp for TIA/CVA at this time - likely LE related to lumbar process and hand numbness related to cervical process  - CT C spine  - MRI showed no CVA  - MRI L spine showed  T12-L1 moderate narrowing of the left neural foramen and moderate to severe narrowing right neural foramen, L2-3 moderate narrowing of the spinal canal w/ moderate narrowing of the left neural foramen, L3-4 moderate to severe narrowing of the spinal canal w/ moderate narrowing of the left neural foramen. L4-5 severe narrowing of the spinal canal with the compression of the thecal sac w/ moderate narrowing of the right neural foramen and severe narrowing of the left neural foramen and L5-S1 moderate to severe narrowing of the left neural foramen  - PT eval  - neurology consulted   - fall precautions    R cervicalgia  - Warm compress  - Tylenol, PRN Ketorolac & flexeril prn    HTN  - c/w Amlodipine & lisinopril    Hypothyroidism  - c/w synthroid    HLD  - c/w Lipitor     Prophylaxis:  DVT: Lovenox  GI: Protonix 74 y/o Morbidly obese F w/ PMHx of HTN, HLD, GERD, Hypothyroid presents to the ED c/o LLE weakness and b/l hand numbness w/ R sided neck pain.      LLE weakness and b/l hand numbness.  - Low susp for TIA/CVA at this time - likely LE related to lumbar process and hand numbness related to cervical process  - CT C spine  - MRI showed no CVA  - MRI L spine showed  T12-L1 moderate narrowing of the left neural foramen and moderate to severe narrowing right neural foramen, L2-3 moderate narrowing of the spinal canal w/ moderate narrowing of the left neural foramen, L3-4 moderate to severe narrowing of the spinal canal w/ moderate narrowing of the left neural foramen. L4-5 severe narrowing of the spinal canal with the compression of the thecal sac w/ moderate narrowing of the right neural foramen and severe narrowing of the left neural foramen and L5-S1 moderate to severe narrowing of the left neural foramen  - PT eval  - neurology consulted   - fall precautions    R cervicalgia  - Warm compress  - Tylenol, PRN Ketorolac & flexeril prn    HTN  - c/w Amlodipine & lisinopril    Hypothyroidism  - c/w synthroid    HLD  - c/w Lipitor     Prophylaxis:  DVT: Lovenox  GI: Protonix 74 y/o Morbidly obese F w/ PMHx of HTN, HLD, GERD, Hypothyroid presents to the ED c/o LLE weakness and b/l hand numbness w/ R sided neck pain.      LLE weakness and b/l hand numbness.  - Low susp for TIA/CVA at this time - likely LE related to lumbar process and hand numbness related to cervical process  - CT C spine showed severe C5-C6 and C6-C7 degenerative disc disease  - MRI showed no CVA  - CT lumbar spine showed moderate to severe spinal stenosis L3-4. Severe spinal stenosis L4-5  - MRI L spine showed  T12-L1 moderate narrowing of the left neural foramen and moderate to severe narrowing right neural foramen, L2-3 moderate narrowing of the spinal canal w/ moderate narrowing of the left neural foramen, L3-4 moderate to severe narrowing of the spinal canal w/ moderate narrowing of the left neural foramen. L4-5 severe narrowing of the spinal canal with the compression of the thecal sac w/ moderate narrowing of the right neural foramen and severe narrowing of the left neural foramen and L5-S1 moderate to severe narrowing of the left neural foramen  - PT eval  - neurology consulted   - fall precautions    Medialization of the left vocal cord suggestive of vocal cord paralysis on CT  - patient has no difficulty w/ speech   - will need outpatient ENT eval     R cervicalgia  - Warm compress  - Tylenol, PRN Ketorolac & flexeril prn    HTN  - c/w Amlodipine & lisinopril    Hypothyroidism  - c/w synthroid    HLD  - c/w Lipitor     Prophylaxis:  DVT: Lovenox  GI: Protonix 72 y/o Morbidly obese F w/ PMHx of HTN, HLD, GERD, Hypothyroid presents to the ED c/o LLE weakness and b/l hand numbness w/ R sided neck pain.      LLE weakness and b/l hand numbness.  - Low susp for TIA/CVA at this time - likely LE related to lumbar process and hand numbness related to cervical process  - CT C spine showed severe C5-C6 and C6-C7 degenerative disc disease  - MRI showed no CVA  - CT lumbar spine showed moderate to severe spinal stenosis L3-4. Severe spinal stenosis L4-5  - MRI L spine showed  T12-L1 moderate narrowing of the left neural foramen and moderate to severe narrowing right neural foramen, L2-3 moderate narrowing of the spinal canal w/ moderate narrowing of the left neural foramen, L3-4 moderate to severe narrowing of the spinal canal w/ moderate narrowing of the left neural foramen. L4-5 severe narrowing of the spinal canal with the compression of the thecal sac w/ moderate narrowing of the right neural foramen and severe narrowing of the left neural foramen and L5-S1 moderate to severe narrowing of the left neural foramen  - PT eval  - neurology consulted   - fall precautions    Medialization of the left vocal cord suggestive of vocal cord paralysis on CT  - patient has no difficulty w/ speech and reports a history of vocal cord complications post surgery      R cervicalgia  - Warm compress  - Tylenol, PRN Ketorolac & flexeril prn    HTN  - c/w Amlodipine & lisinopril    Hypothyroidism  - c/w synthroid    HLD  - c/w Lipitor     Prophylaxis:  DVT: Lovenox  GI: Protonix 74 y/o Morbidly obese F w/ PMHx of HTN, HLD, GERD, Hypothyroid presents to the ED c/o LLE weakness and b/l hand numbness w/ R sided neck pain.      LLE weakness and b/l hand numbness.  - Low susp for TIA/CVA at this time - likely LE related to lumbar process and hand numbness related to cervical process  - CT C spine showed severe C5-C6 and C6-C7 degenerative disc disease  - MRI showed no CVA  - CT lumbar spine showed moderate to severe spinal stenosis L3-4. Severe spinal stenosis L4-5  - MRI L spine showed  T12-L1 moderate narrowing of the left neural foramen and moderate to severe narrowing right neural foramen, L2-3 moderate narrowing of the spinal canal w/ moderate narrowing of the left neural foramen, L3-4 moderate to severe narrowing of the spinal canal w/ moderate narrowing of the left neural foramen. L4-5 severe narrowing of the spinal canal with the compression of the thecal sac w/ moderate narrowing of the right neural foramen and severe narrowing of the left neural foramen and L5-S1 moderate to severe narrowing of the left neural foramen  - PT eval  - neurology consulted   - fall precautions    Medialization of the left vocal cord suggestive of vocal cord paralysis on CT  - patient has no difficulty w/ speech and reports a history of vocal cord complications post surgery      R cervicalgia  - Warm compress  - Tylenol, PRN Ketorolac & flexeril prn    HTN  - c/w Amlodipine & lisinopril    Hypothyroidism  - c/w synthroid    HLD  - c/w Lipitor     Prophylaxis:  DVT: Lovenox  GI: Protonix

## 2023-12-20 NOTE — CONSULT NOTE ADULT - SUBJECTIVE AND OBJECTIVE BOX
74y/o F presents w/ BL Hand numbness and L Lower back pain w/ associated LLE Radiculopathy x3 weeks. Community ambulator w/o assistance at baseline. Has been able to ambulate since onset of sx w/o difficulty. No recent trauma or falls. No hx of similar sx in the past. No red flag sx. Sx intermittent, no inciting events. Pt notes pain has been improving since onset and has improved since getting flexeril during this admission. No sx at present. Pt was seen in ED 3 weeks ago w/ similar sx + GI Sx + LUE Swelling (Resolved), dx'd w/ Infectious colitis and dc'd home. No loss in hand dexterity. Does not feel off balance w/ walking. Denies CP, SOB, fever, chills or any other complaints.     MEDICATIONS  (STANDING):  amLODIPine   Tablet 10 milliGRAM(s) Oral daily  aspirin enteric coated 81 milliGRAM(s) Oral daily  atorvastatin 80 milliGRAM(s) Oral at bedtime  enoxaparin Injectable 40 milliGRAM(s) SubCutaneous every 12 hours  levothyroxine 25 MICROGram(s) Oral daily  lisinopril 40 milliGRAM(s) Oral daily  pantoprazole    Tablet 40 milliGRAM(s) Oral before breakfast      Allergies    No Known Allergies    Intolerances        PAST MEDICAL & SURGICAL HISTORY:  Hypertension    High cholesterol    Hypothyroid    No significant past surgical history                              10.3   7.18  )-----------( 274      ( 20 Dec 2023 06:51 )             31.7       20 Dec 2023 06:51    143    |  112    |  22     ----------------------------<  92     3.7     |  25     |  1.09     Ca    8.9        20 Dec 2023 06:51  Phos  4.1       19 Dec 2023 09:30  Mg     2.3       19 Dec 2023 09:30    TPro  7.6    /  Alb  3.9    /  TBili  0.6    /  DBili  x      /  AST  21     /  ALT  27     /  AlkPhos  91     19 Dec 2023 09:30          Urinalysis Basic - ( 20 Dec 2023 06:51 )    Color: x / Appearance: x / SG: x / pH: x  Gluc: 92 mg/dL / Ketone: x  / Bili: x / Urobili: x   Blood: x / Protein: x / Nitrite: x   Leuk Esterase: x / RBC: x / WBC x   Sq Epi: x / Non Sq Epi: x / Bacteria: x        Vital Signs Last 24 Hrs  T(C): 37 (12-20-23 @ 16:11), Max: 37.1 (12-19-23 @ 23:38)  T(F): 98.6 (12-20-23 @ 16:11), Max: 98.8 (12-19-23 @ 23:38)  HR: 78 (12-20-23 @ 16:11) (72 - 90)  BP: 120/76 (12-20-23 @ 16:11) (114/73 - 130/80)  BP(mean): --  RR: 18 (12-20-23 @ 16:11) (17 - 18)  SpO2: 98% (12-20-23 @ 16:11) (92% - 98%)    Gen: NAD    Spine PE:  Skin intact  No gross deformity  No midnline TTP C/T/L/S spine  No bony step offs  L Lsp Paraspinal TTP  Negative clonus  Negative babinski  Negative ochoa  + Spurlings    Motor:                     C5                C6              C7               C8           T1   R            5/5                5/5            5/5             5/5          5/5  L             5/5               5/5             5/5             5/5          5/5                  L2             L3             L4               L5            S1  R         5/5           5/5          5/5             5/5           5/5  L          3+/5          4/5           5/5             5/5           5/5    Sensory:            C5         C6         C7      C8       T1        (0=absent, 1=impaired, 2=normal, NT=not testable)  R        2          2              2        2         2  L         2          2              2        2         2               L2          L3         L4      L5       S1         (0=absent, 1=impaired, 2=normal, NT=not testable)  R          2          2              2        2         2  L          2          2              2        2         2      A/P: 73y Female with C/Lsp Degenerative Changes.     Imaging reviewed and discussed w/ patient at bedside.   WBAT, PT/OT  Pain control prn  DVT PPx: Per primary  Recommend IV Decadron 10mg once, then IV Decadron 6mg q8hrs. Dc on Medrol dose pack.   Recommend Flexeril 10mg TID, Gabapentin 300mg BID  Ortho stable for dc  No acute orthopedic surgical intervention  Follow up with Dr. Mehta in office in 2 weeks after discharge. Please call office for appointment.   Discussed plan w/ Dr. Mehta who agrees.

## 2023-12-20 NOTE — PATIENT PROFILE ADULT - FALL HARM RISK - HARM RISK INTERVENTIONS
Assistance with ambulation/Assistance OOB with selected safe patient handling equipment/Communicate Risk of Fall with Harm to all staff/Reinforce activity limits and safety measures with patient and family/Tailored Fall Risk Interventions/Visual Cue: Yellow wristband and red socks/Bed in lowest position, wheels locked, appropriate side rails in place/Call bell, personal items and telephone in reach/Instruct patient to call for assistance before getting out of bed or chair/Non-slip footwear when patient is out of bed/[place] to call system/Physically safe environment - no spills, clutter or unnecessary equipment/Purposeful Proactive Rounding/Room/bathroom lighting operational, light cord in reach Assistance with ambulation/Assistance OOB with selected safe patient handling equipment/Communicate Risk of Fall with Harm to all staff/Reinforce activity limits and safety measures with patient and family/Tailored Fall Risk Interventions/Visual Cue: Yellow wristband and red socks/Bed in lowest position, wheels locked, appropriate side rails in place/Call bell, personal items and telephone in reach/Instruct patient to call for assistance before getting out of bed or chair/Non-slip footwear when patient is out of bed/[place] to call system/Physically safe environment - no spills, clutter or unnecessary equipment/Purposeful Proactive Rounding/Room/bathroom lighting operational, light cord in reach

## 2023-12-20 NOTE — PROGRESS NOTE ADULT - SUBJECTIVE AND OBJECTIVE BOX
Patient is a 73y old  Female who presents with a chief complaint of LLE weakness, B/L hand numbness (19 Dec 2023 15:50)      INTERVAL HPI/OVERNIGHT EVENTS:    MEDICATIONS  (STANDING):  amLODIPine   Tablet 10 milliGRAM(s) Oral daily  aspirin enteric coated 81 milliGRAM(s) Oral daily  atorvastatin 80 milliGRAM(s) Oral at bedtime  enoxaparin Injectable 40 milliGRAM(s) SubCutaneous every 12 hours  levothyroxine 25 MICROGram(s) Oral daily  lisinopril 40 milliGRAM(s) Oral daily  pantoprazole    Tablet 40 milliGRAM(s) Oral before breakfast    MEDICATIONS  (PRN):  acetaminophen     Tablet .. 650 milliGRAM(s) Oral every 6 hours PRN Temp greater or equal to 38C (100.4F), Mild Pain (1 - 3)  aluminum hydroxide/magnesium hydroxide/simethicone Suspension 30 milliLiter(s) Oral every 4 hours PRN Dyspepsia  cyclobenzaprine 5 milliGRAM(s) Oral three times a day PRN Muscle Spasm  ketorolac   Injectable 15 milliGRAM(s) IV Push every 6 hours PRN Severe Pain (7 - 10)  melatonin 3 milliGRAM(s) Oral at bedtime PRN Insomnia  ondansetron Injectable 4 milliGRAM(s) IV Push every 8 hours PRN Nausea and/or Vomiting      Allergies    No Known Allergies    Intolerances        Vital Signs Last 24 Hrs  T(C): 36.6 (20 Dec 2023 10:11), Max: 37.1 (19 Dec 2023 23:38)  T(F): 97.9 (20 Dec 2023 10:11), Max: 98.8 (19 Dec 2023 23:38)  HR: 86 (20 Dec 2023 10:11) (72 - 90)  BP: 114/73 (20 Dec 2023 10:11) (114/73 - 143/91)  BP(mean): --  RR: 18 (20 Dec 2023 10:11) (15 - 18)  SpO2: 95% (20 Dec 2023 10:11) (92% - 96%)    Parameters below as of 20 Dec 2023 07:00  Patient On (Oxygen Delivery Method): room air        PHYSICAL EXAM:  GENERAL: NAD, well-groomed, well-developed  HEAD:  Atraumatic, Normocephalic  EYES: EOMI, PERRLA, conjunctiva and sclera clear  ENMT: No tonsillar erythema, exudates, or enlargement; Moist mucous membranes, Good dentition, No lesions  NECK: Supple, No JVD, Normal thyroid  NERVOUS SYSTEM:  Alert & Oriented X3, Good concentration; Motor Strength 5/5 B/L upper and lower extremities; DTRs 2+ intact and symmetric  CHEST/LUNG: Clear to auscultation bilaterally; No rales, rhonchi, wheezing, or rubs  HEART: Regular rate and rhythm; No murmurs, rubs, or gallops  ABDOMEN: Soft, Nontender, Nondistended; Bowel sounds present  EXTREMITIES:  2+ Peripheral Pulses, No clubbing, cyanosis, or edema  LYMPH: No lymphadenopathy noted  SKIN: No rashes or lesions    LABS:                        10.3   7.18  )-----------( 274      ( 20 Dec 2023 06:51 )             31.7     12-20    143  |  112<H>  |  22  ----------------------------<  92  3.7   |  25  |  1.09    Ca    8.9      20 Dec 2023 06:51  Phos  4.1     12-19  Mg     2.3     12-19    TPro  7.6  /  Alb  3.9  /  TBili  0.6  /  DBili  x   /  AST  21  /  ALT  27  /  AlkPhos  91  12-19      Urinalysis Basic - ( 20 Dec 2023 06:51 )    Color: x / Appearance: x / SG: x / pH: x  Gluc: 92 mg/dL / Ketone: x  / Bili: x / Urobili: x   Blood: x / Protein: x / Nitrite: x   Leuk Esterase: x / RBC: x / WBC x   Sq Epi: x / Non Sq Epi: x / Bacteria: x       RADIOLOGY & ADDITIONAL TESTS:    12-20-23 @ 07:01  -  12-20-23 @ 13:43  --------------------------------------------------------  IN:    Oral Fluid: 320 mL  Total IN: 320 mL    OUT:  Total OUT: 0 mL    Total NET: 320 mL       72 y/o Morbidly obese F w/ PMHx of HTN, HLD, GERD, Hypothyroid presents to the ED c/o LLE weakness and b/l hand numbness w/ R sided neck pain. She is lying in bed in NAD.    MEDICATIONS  (STANDING):  amLODIPine   Tablet 10 milliGRAM(s) Oral daily  aspirin enteric coated 81 milliGRAM(s) Oral daily  atorvastatin 80 milliGRAM(s) Oral at bedtime  enoxaparin Injectable 40 milliGRAM(s) SubCutaneous every 12 hours  levothyroxine 25 MICROGram(s) Oral daily  lisinopril 40 milliGRAM(s) Oral daily  pantoprazole    Tablet 40 milliGRAM(s) Oral before breakfast    MEDICATIONS  (PRN):  acetaminophen     Tablet .. 650 milliGRAM(s) Oral every 6 hours PRN Temp greater or equal to 38C (100.4F), Mild Pain (1 - 3)  aluminum hydroxide/magnesium hydroxide/simethicone Suspension 30 milliLiter(s) Oral every 4 hours PRN Dyspepsia  cyclobenzaprine 5 milliGRAM(s) Oral three times a day PRN Muscle Spasm  ketorolac   Injectable 15 milliGRAM(s) IV Push every 6 hours PRN Severe Pain (7 - 10)  melatonin 3 milliGRAM(s) Oral at bedtime PRN Insomnia  ondansetron Injectable 4 milliGRAM(s) IV Push every 8 hours PRN Nausea and/or Vomiting      Allergies    No Known Allergies    Intolerances        Vital Signs Last 24 Hrs  T(C): 36.6 (20 Dec 2023 10:11), Max: 37.1 (19 Dec 2023 23:38)  T(F): 97.9 (20 Dec 2023 10:11), Max: 98.8 (19 Dec 2023 23:38)  HR: 86 (20 Dec 2023 10:11) (72 - 90)  BP: 114/73 (20 Dec 2023 10:11) (114/73 - 143/91)  BP(mean): --  RR: 18 (20 Dec 2023 10:11) (15 - 18)  SpO2: 95% (20 Dec 2023 10:11) (92% - 96%)    Parameters below as of 20 Dec 2023 07:00  Patient On (Oxygen Delivery Method): room air        PHYSICAL EXAM:  GENERAL: NAD, well-groomed, well-developed  HEAD:  Atraumatic, Normocephalic  EYES: EOMI, PERRLA   NECK: Supple, No JVD, Normal thyroid  NERVOUS SYSTEM:  Alert & Oriented X3   CHEST/LUNG: Clear to auscultation bilaterally; No rales, rhonchi, wheezing, or rubs  HEART: Regular rate and rhythm; No murmurs, rubs, or gallops  ABDOMEN: Soft, Nontender, Nondistended; Bowel sounds present  EXTREMITIES:  No clubbing, cyanosis, or edema       LABS:                        10.3   7.18  )-----------( 274      ( 20 Dec 2023 06:51 )             31.7     12-20    143  |  112<H>  |  22  ----------------------------<  92  3.7   |  25  |  1.09    Ca    8.9      20 Dec 2023 06:51  Phos  4.1     12-19  Mg     2.3     12-19    TPro  7.6  /  Alb  3.9  /  TBili  0.6  /  DBili  x   /  AST  21  /  ALT  27  /  AlkPhos  91  12-19      Urinalysis Basic - ( 20 Dec 2023 06:51 )    Color: x / Appearance: x / SG: x / pH: x  Gluc: 92 mg/dL / Ketone: x  / Bili: x / Urobili: x   Blood: x / Protein: x / Nitrite: x   Leuk Esterase: x / RBC: x / WBC x   Sq Epi: x / Non Sq Epi: x / Bacteria: x       RADIOLOGY & ADDITIONAL TESTS:    12-20-23 @ 07:01  -  12-20-23 @ 13:43  --------------------------------------------------------  IN:    Oral Fluid: 320 mL  Total IN: 320 mL    OUT:  Total OUT: 0 mL    Total NET: 320 mL       74 y/o Morbidly obese F w/ PMHx of HTN, HLD, GERD, Hypothyroid presents to the ED c/o LLE weakness and b/l hand numbness w/ R sided neck pain. She is lying in bed in NAD.    MEDICATIONS  (STANDING):  amLODIPine   Tablet 10 milliGRAM(s) Oral daily  aspirin enteric coated 81 milliGRAM(s) Oral daily  atorvastatin 80 milliGRAM(s) Oral at bedtime  enoxaparin Injectable 40 milliGRAM(s) SubCutaneous every 12 hours  levothyroxine 25 MICROGram(s) Oral daily  lisinopril 40 milliGRAM(s) Oral daily  pantoprazole    Tablet 40 milliGRAM(s) Oral before breakfast    MEDICATIONS  (PRN):  acetaminophen     Tablet .. 650 milliGRAM(s) Oral every 6 hours PRN Temp greater or equal to 38C (100.4F), Mild Pain (1 - 3)  aluminum hydroxide/magnesium hydroxide/simethicone Suspension 30 milliLiter(s) Oral every 4 hours PRN Dyspepsia  cyclobenzaprine 5 milliGRAM(s) Oral three times a day PRN Muscle Spasm  ketorolac   Injectable 15 milliGRAM(s) IV Push every 6 hours PRN Severe Pain (7 - 10)  melatonin 3 milliGRAM(s) Oral at bedtime PRN Insomnia  ondansetron Injectable 4 milliGRAM(s) IV Push every 8 hours PRN Nausea and/or Vomiting      Allergies    No Known Allergies    Intolerances        Vital Signs Last 24 Hrs  T(C): 36.6 (20 Dec 2023 10:11), Max: 37.1 (19 Dec 2023 23:38)  T(F): 97.9 (20 Dec 2023 10:11), Max: 98.8 (19 Dec 2023 23:38)  HR: 86 (20 Dec 2023 10:11) (72 - 90)  BP: 114/73 (20 Dec 2023 10:11) (114/73 - 143/91)  BP(mean): --  RR: 18 (20 Dec 2023 10:11) (15 - 18)  SpO2: 95% (20 Dec 2023 10:11) (92% - 96%)    Parameters below as of 20 Dec 2023 07:00  Patient On (Oxygen Delivery Method): room air        PHYSICAL EXAM:  GENERAL: NAD, well-groomed, well-developed  HEAD:  Atraumatic, Normocephalic  EYES: EOMI, PERRLA   NECK: Supple, No JVD, Normal thyroid  NERVOUS SYSTEM:  Alert & Oriented X3   CHEST/LUNG: Clear to auscultation bilaterally; No rales, rhonchi, wheezing, or rubs  HEART: Regular rate and rhythm; No murmurs, rubs, or gallops  ABDOMEN: Soft, Nontender, Nondistended; Bowel sounds present  EXTREMITIES:  No clubbing, cyanosis, or edema       LABS:                        10.3   7.18  )-----------( 274      ( 20 Dec 2023 06:51 )             31.7     12-20    143  |  112<H>  |  22  ----------------------------<  92  3.7   |  25  |  1.09    Ca    8.9      20 Dec 2023 06:51  Phos  4.1     12-19  Mg     2.3     12-19    TPro  7.6  /  Alb  3.9  /  TBili  0.6  /  DBili  x   /  AST  21  /  ALT  27  /  AlkPhos  91  12-19      Urinalysis Basic - ( 20 Dec 2023 06:51 )    Color: x / Appearance: x / SG: x / pH: x  Gluc: 92 mg/dL / Ketone: x  / Bili: x / Urobili: x   Blood: x / Protein: x / Nitrite: x   Leuk Esterase: x / RBC: x / WBC x   Sq Epi: x / Non Sq Epi: x / Bacteria: x       RADIOLOGY & ADDITIONAL TESTS:    12-20-23 @ 07:01  -  12-20-23 @ 13:43  --------------------------------------------------------  IN:    Oral Fluid: 320 mL  Total IN: 320 mL    OUT:  Total OUT: 0 mL    Total NET: 320 mL

## 2023-12-21 PROCEDURE — 99232 SBSQ HOSP IP/OBS MODERATE 35: CPT

## 2023-12-21 RX ORDER — DEXAMETHASONE 0.5 MG/5ML
6 ELIXIR ORAL EVERY 8 HOURS
Refills: 0 | Status: DISCONTINUED | OUTPATIENT
Start: 2023-12-21 | End: 2023-12-22

## 2023-12-21 RX ORDER — GABAPENTIN 400 MG/1
100 CAPSULE ORAL
Refills: 0 | Status: DISCONTINUED | OUTPATIENT
Start: 2023-12-21 | End: 2023-12-22

## 2023-12-21 RX ORDER — DEXAMETHASONE 0.5 MG/5ML
10 ELIXIR ORAL ONCE
Refills: 0 | Status: COMPLETED | OUTPATIENT
Start: 2023-12-21 | End: 2023-12-21

## 2023-12-21 RX ADMIN — GABAPENTIN 100 MILLIGRAM(S): 400 CAPSULE ORAL at 21:24

## 2023-12-21 RX ADMIN — Medication 15 MILLIGRAM(S): at 06:54

## 2023-12-21 RX ADMIN — Medication 81 MILLIGRAM(S): at 11:19

## 2023-12-21 RX ADMIN — Medication 6 MILLIGRAM(S): at 19:03

## 2023-12-21 RX ADMIN — Medication 25 MICROGRAM(S): at 05:42

## 2023-12-21 RX ADMIN — ATORVASTATIN CALCIUM 80 MILLIGRAM(S): 80 TABLET, FILM COATED ORAL at 21:24

## 2023-12-21 RX ADMIN — PANTOPRAZOLE SODIUM 40 MILLIGRAM(S): 20 TABLET, DELAYED RELEASE ORAL at 05:42

## 2023-12-21 RX ADMIN — Medication 102 MILLIGRAM(S): at 10:51

## 2023-12-21 RX ADMIN — CYCLOBENZAPRINE HYDROCHLORIDE 5 MILLIGRAM(S): 10 TABLET, FILM COATED ORAL at 05:41

## 2023-12-21 NOTE — PHYSICAL THERAPY INITIAL EVALUATION ADULT - GENERAL OBSERVATIONS, REHAB EVAL
Pt is alert, oriented x 4, follow instructions, on room air, c/o weakness in the lower extremities especially the LLE.

## 2023-12-21 NOTE — CONSULT NOTE ADULT - SUBJECTIVE AND OBJECTIVE BOX
Neurology Consult    Reason for Consult: Patient is a 73y old  Female who presents with a chief complaint of LLE weakness, B/L hand numbness (20 Dec 2023 19:20)      HPI:         74 y/o Morbidly obese F w/ PMHx of HTN, HLD, GERD, Hypothyroid presents to the ED c/o LLE weakness and b/l hand numbness. Pt reports the week prior she had sciatica which has since resolved however after the pain went away she noted her LLE was weak which never happened in the past when she had sciatica before. Pt typically ambulates on her own however in the last 2 days has had to use a cane. Pt also reports this morning she woke up w/ b/l hand numbness and tingling as well and R sided neck pain. pt denies fall or trauma to neck or back. Pt denies bowel or bladder incontinence or saddle anesthesia.      Of note pt came in to the ED w/ L arm numbness on Nov 29th, ED work up was neg and pt told to follow up w/ neurology however she did not.     In ED initial vitals stable. Labs stable. Pt underwent CT head and Angio for presumed possible CVA which were unremarkable (19 Dec 2023 15:50)     Pt was seen in ED 3 weeks ago w/ similar sx + GI Sx + LUE Swelling (Resolved), dx'd w/ Infectious colitis and dc'd home. No loss in hand dexterity. Does not feel off balance w/ walking. Denies CP, SOB, fever, chills or any other complaints.        PAST MEDICAL & SURGICAL HISTORY:  Hypertension      High cholesterol      Hypothyroid      No significant past surgical history          Allergies: Allergies    No Known Allergies    Intolerances        Social History: Denies toxic habits including tobacco, ETOH or illicit drugs.    Family History: FAMILY HISTORY:  No pertinent family history in first degree relatives    . No family history of strokes    Medications: MEDICATIONS  (STANDING):  amLODIPine   Tablet 10 milliGRAM(s) Oral daily  aspirin enteric coated 81 milliGRAM(s) Oral daily  atorvastatin 80 milliGRAM(s) Oral at bedtime  enoxaparin Injectable 40 milliGRAM(s) SubCutaneous every 12 hours  levothyroxine 25 MICROGram(s) Oral daily  lisinopril 40 milliGRAM(s) Oral daily  pantoprazole    Tablet 40 milliGRAM(s) Oral before breakfast    MEDICATIONS  (PRN):  acetaminophen     Tablet .. 650 milliGRAM(s) Oral every 6 hours PRN Temp greater or equal to 38C (100.4F), Mild Pain (1 - 3)  aluminum hydroxide/magnesium hydroxide/simethicone Suspension 30 milliLiter(s) Oral every 4 hours PRN Dyspepsia  cyclobenzaprine 5 milliGRAM(s) Oral three times a day PRN Muscle Spasm  ketorolac   Injectable 15 milliGRAM(s) IV Push every 6 hours PRN Severe Pain (7 - 10)  melatonin 3 milliGRAM(s) Oral at bedtime PRN Insomnia  ondansetron Injectable 4 milliGRAM(s) IV Push every 8 hours PRN Nausea and/or Vomiting      Review of Systems:  CONSTITUTIONAL:  No weight loss, fever, chills, weakness or fatigue.  HEENT:  Eyes:  No visual loss, blurred vision, double vision or yellow sclera. Ears, Nose, Throat:  No hearing loss, sneezing, congestion, runny nose or sore throat.  SKIN:  No rash or itching.  CARDIOVASCULAR:  No chest pain, chest pressure or chest discomfort. No palpitations or edema.  RESPIRATORY:  No shortness of breath, cough or sputum.  GASTROINTESTINAL:  No anorexia, nausea, vomiting or diarrhea. No abdominal pain or blood.  GENITOURINARY:  No burning on urination or incontinence   NEUROLOGICAL:  No headache, dizziness, syncope, paralysis, ataxia, numbness or tingling in the extremities. No change in bowel or bladder control. no limb weakness. no vision changes.   MUSCULOSKELETAL:  No muscle, back pain, joint pain or stiffness.  HEMATOLOGIC:  No anemia, bleeding or bruising.  LYMPHATICS:  No enlarged nodes. No history of splenectomy.  PSYCHIATRIC:  No history of depression or anxiety.  ENDOCRINOLOGIC:  No reports of sweating, cold or heat intolerance. No polyuria or polydipsia.      Vitals:  Vital Signs Last 24 Hrs  T(C): 36.5 (21 Dec 2023 04:49), Max: 37 (20 Dec 2023 16:11)  T(F): 97.7 (21 Dec 2023 04:49), Max: 98.6 (20 Dec 2023 16:11)  HR: 71 (21 Dec 2023 04:49) (69 - 86)  BP: 107/72 (21 Dec 2023 04:49) (107/72 - 120/76)  BP(mean): --  RR: 18 (21 Dec 2023 04:49) (18 - 18)  SpO2: 93% (21 Dec 2023 04:49) (93% - 98%)    Parameters below as of 21 Dec 2023 04:49  Patient On (Oxygen Delivery Method): room air        General Exam:   General Appearance: Appropriately dressed and in no acute distress       Head: Normocephalic, atraumatic and no dysmorphic features  Ear, Nose, and Throat: Moist mucous membranes  CVS: S1S2+  Resp: No SOB, no wheeze or rhonchi  GI: soft NT/ND  Extremities: No edema or cyanosis  Skin: No bruises or rashes     Neurological Exam:  Mental Status: Awake, alert and oriented x 3.  Able to follow simple and complex verbal commands. Able to name and repeat. fluent speech. No obvious aphasia or dysarthria noted.   Cranial Nerves: PERRL, EOMI, VFFC, sensation V1-V3 intact,  no obvious facial asymmetry, equal elevation of palate, scm/trap 5/5, tongue is midline on protrusion. no obvious papilledema on fundoscopic exam. hearing is grossly intact.   Motor: Normal bulk, tone and strength throughout. Fine finger movements were intact and symmetric. no tremors or drift noted.    Sensation: Intact to light touch and pinprick throughout. no right/left confusion. no extinction to tactile on DSS. Romberg was negative.   Reflexes: 1+ throughout at biceps, brachioradialis, triceps, patellars and ankles bilaterally and equal. No clonus. R toe and L toe were both downgoing.  Coordination: No dysmetria on FNF or HKS  Gait: Narrow based and steady. Able to tandem. no limitations in gait.     Data/Labs/Imaging which I personally reviewed.     Labs:     CBC Full  -  ( 20 Dec 2023 06:51 )  WBC Count : 7.18 K/uL  RBC Count : 3.47 M/uL  Hemoglobin : 10.3 g/dL  Hematocrit : 31.7 %  Platelet Count - Automated : 274 K/uL  Mean Cell Volume : 91.4 fl  Mean Cell Hemoglobin : 29.7 pg  Mean Cell Hemoglobin Concentration : 32.5 g/dL  Auto Neutrophil # : x  Auto Lymphocyte # : x  Auto Monocyte # : x  Auto Eosinophil # : x  Auto Basophil # : x  Auto Neutrophil % : x  Auto Lymphocyte % : x  Auto Monocyte % : x  Auto Eosinophil % : x  Auto Basophil % : x    12-20    143  |  112<H>  |  22  ----------------------------<  92  3.7   |  25  |  1.09    Ca    8.9      20 Dec 2023 06:51  Phos  4.1     12-19  Mg     2.3     12-19    TPro  7.6  /  Alb  3.9  /  TBili  0.6  /  DBili  x   /  AST  21  /  ALT  27  /  AlkPhos  91  12-19    LIVER FUNCTIONS - ( 19 Dec 2023 09:30 )  Alb: 3.9 g/dL / Pro: 7.6 gm/dL / ALK PHOS: 91 U/L / ALT: 27 U/L / AST: 21 U/L / GGT: x             Urinalysis Basic - ( 20 Dec 2023 06:51 )    Color: x / Appearance: x / SG: x / pH: x  Gluc: 92 mg/dL / Ketone: x  / Bili: x / Urobili: x   Blood: x / Protein: x / Nitrite: x   Leuk Esterase: x / RBC: x / WBC x   Sq Epi: x / Non Sq Epi: x / Bacteria: x          < from: CT Head No Cont (12.19.23 @ 11:39) >  IMPRESSION:    CT HEAD:  1.  No evidence of acute intracranial hemorrhage or midline shift.  2.  Chronic small vessel disease. If there is continued concern for acute   neurologic compromise, recommend MRI of the brain for further evaluation.    CTA BRAIN:  1.  No evidence of intracranial aneurysm, critical stenosis, or abrupt   cut off of intraluminal contrast.    CTA NECK:  1.  No evidence of critical stenosis by NASCET criteria.  2.  Soft tissue fullness to the left vocal fold. Recommend direct visual   inspection.    < end of copied text >  < from: CT Lumbar Spine No Cont (12.19.23 @ 16:59) >    IMPRESSION: Mild levoscoliosis. Degenerative changes at multiple levels   with multi level vacuum disc phenomenon and degenerative facet changes.   Moderate to severe spinal stenosis L3-4. Severe spinal stenosis L4-5.    < end of copied text >  < from: CT Cervical Spine No Cont (12.19.23 @ 16:59) >    IMPRESSION:  No acute fracture.    Multilevel degenerative changes.    Medialization of the left vocal cord suggestive of vocal cord paralysis.   Correlate with direct visualization.    MRI cervical spine should be considered if further evaluation of the   spinal canal soft tissue contents is required.    < end of copied text >  < from: MR Head No Cont (12.20.23 @ 11:37) >    IMPRESSION: No acute hemorrhage mass or mass effect.    MRI of the lumbar spine was performed sagittal T1-T2 and STIR sequences.   Axial T1 and T2-weighted sequence was performed. Coronal T1-weighted   sequence was performed    Reversal of the normallumbar lordosis is seen.    Mild scoliosis is seen    There is grade 1 anterolisthesis involving L4 on L5. Possible lysis   defect is seen on the left side. This can be confirmed with plain film.    The vertebral body height appears maintained.    Schmorl's node is seen involving multiple levels secondary to chronic   degenerative    Disc desiccation is seen throughout the lower thoracic and lumbar spine   region secondary to chronic degenerative changes.    Hemangioma is seen involving the T12 vertebral body    T11-12: Normal    T12-L1: Bilateral hypertrophic facet changes. Moderate narrowing of the   left neural foramen and moderate to severe narrowing right neural foramen.    L1-2: Bilateral hypertrophic facet joint changes are seen. Mild narrowing   of the spinal canal.    L2-3: Disc bulge and bilateral hypertrophic facet joint changes seen.   Hypertrophic change involving both ligamentum flavum. Moderate narrowing   of the spinal canal. Moderate narrowing of the left neural foramen    L3-4: Disc bulge and bilateral hypertrophic facet joint changes seen.   Hypertrophic changes seen involving both ligamentum flavum. Moderate to   severe narrowing of the spinal canal. Moderate narrowing of the left   neural foramen    L4-5: Disc bulge and small central disc herniation is seen. Hypertrophic   change involving both facet and ligamentum flavum. Severe narrowing of   the spinal canal seen with the compression of the thecal sac. Moderate   narrowing of the right neural foramen and severe narrowing of the left   neural foramen    L5-S1: Disc bulge and bilateral hypertrophic facet joint changes seen.   Moderate to severe narrowing of the left neural foramen    The conus ends at L1 and appears normal    Evaluation of the paraspinal soft tissues appear normal.    Bilateral renal cysts are seen.    Both SI joints appear intact.    IMPRESSION: Degenerative changes as described above.    < end of copied text >   Neurology Consult    Reason for Consult: Patient is a 73y old  Female who presents with a chief complaint of LLE weakness, B/L hand numbness (20 Dec 2023 19:20)      HPI:         72 y/o Morbidly obese F w/ PMHx of HTN, HLD, GERD, Hypothyroid presents to the ED c/o LLE weakness and b/l hand numbness. Pt reports the week prior she had sciatica which has since resolved however after the pain went away she noted her LLE was weak which never happened in the past when she had sciatica before. Pt typically ambulates on her own however in the last 2 days has had to use a cane. Pt also reports this morning she woke up w/ b/l hand numbness and tingling as well and R sided neck pain. pt denies fall or trauma to neck or back. Pt denies bowel or bladder incontinence or saddle anesthesia.      Of note pt came in to the ED w/ L arm numbness on Nov 29th, ED work up was neg and pt told to follow up w/ neurology however she did not.     In ED initial vitals stable. Labs stable. Pt underwent CT head and Angio for presumed possible CVA which were unremarkable (19 Dec 2023 15:50)     Pt was seen in ED 3 weeks ago w/ similar sx + GI Sx + LUE Swelling (Resolved), dx'd w/ Infectious colitis and dc'd home. No loss in hand dexterity. Does not feel off balance w/ walking. Denies CP, SOB, fever, chills or any other complaints.        PAST MEDICAL & SURGICAL HISTORY:  Hypertension      High cholesterol      Hypothyroid      No significant past surgical history          Allergies: Allergies    No Known Allergies    Intolerances        Social History: Denies toxic habits including tobacco, ETOH or illicit drugs.    Family History: FAMILY HISTORY:  No pertinent family history in first degree relatives    . No family history of strokes    Medications: MEDICATIONS  (STANDING):  amLODIPine   Tablet 10 milliGRAM(s) Oral daily  aspirin enteric coated 81 milliGRAM(s) Oral daily  atorvastatin 80 milliGRAM(s) Oral at bedtime  enoxaparin Injectable 40 milliGRAM(s) SubCutaneous every 12 hours  levothyroxine 25 MICROGram(s) Oral daily  lisinopril 40 milliGRAM(s) Oral daily  pantoprazole    Tablet 40 milliGRAM(s) Oral before breakfast    MEDICATIONS  (PRN):  acetaminophen     Tablet .. 650 milliGRAM(s) Oral every 6 hours PRN Temp greater or equal to 38C (100.4F), Mild Pain (1 - 3)  aluminum hydroxide/magnesium hydroxide/simethicone Suspension 30 milliLiter(s) Oral every 4 hours PRN Dyspepsia  cyclobenzaprine 5 milliGRAM(s) Oral three times a day PRN Muscle Spasm  ketorolac   Injectable 15 milliGRAM(s) IV Push every 6 hours PRN Severe Pain (7 - 10)  melatonin 3 milliGRAM(s) Oral at bedtime PRN Insomnia  ondansetron Injectable 4 milliGRAM(s) IV Push every 8 hours PRN Nausea and/or Vomiting      Review of Systems:  CONSTITUTIONAL:  No weight loss, fever, chills, weakness or fatigue.  HEENT:  Eyes:  No visual loss, blurred vision, double vision or yellow sclera. Ears, Nose, Throat:  No hearing loss, sneezing, congestion, runny nose or sore throat.  SKIN:  No rash or itching.  CARDIOVASCULAR:  No chest pain, chest pressure or chest discomfort. No palpitations or edema.  RESPIRATORY:  No shortness of breath, cough or sputum.  GASTROINTESTINAL:  No anorexia, nausea, vomiting or diarrhea. No abdominal pain or blood.  GENITOURINARY:  No burning on urination or incontinence   NEUROLOGICAL:  No headache, dizziness, syncope, paralysis, ataxia, numbness or tingling in the extremities. No change in bowel or bladder control. no limb weakness. no vision changes.   MUSCULOSKELETAL:  No muscle, back pain, joint pain or stiffness.  HEMATOLOGIC:  No anemia, bleeding or bruising.  LYMPHATICS:  No enlarged nodes. No history of splenectomy.  PSYCHIATRIC:  No history of depression or anxiety.  ENDOCRINOLOGIC:  No reports of sweating, cold or heat intolerance. No polyuria or polydipsia.      Vitals:  Vital Signs Last 24 Hrs  T(C): 36.5 (21 Dec 2023 04:49), Max: 37 (20 Dec 2023 16:11)  T(F): 97.7 (21 Dec 2023 04:49), Max: 98.6 (20 Dec 2023 16:11)  HR: 71 (21 Dec 2023 04:49) (69 - 86)  BP: 107/72 (21 Dec 2023 04:49) (107/72 - 120/76)  BP(mean): --  RR: 18 (21 Dec 2023 04:49) (18 - 18)  SpO2: 93% (21 Dec 2023 04:49) (93% - 98%)    Parameters below as of 21 Dec 2023 04:49  Patient On (Oxygen Delivery Method): room air        General Exam:   General Appearance: Appropriately dressed and in no acute distress       Head: Normocephalic, atraumatic and no dysmorphic features  Ear, Nose, and Throat: Moist mucous membranes  CVS: S1S2+  Resp: No SOB, no wheeze or rhonchi  GI: soft NT/ND  Extremities: No edema or cyanosis  Skin: No bruises or rashes     Neurological Exam:  Mental Status: Awake, alert and oriented x 3.  Able to follow simple and complex verbal commands. Able to name and repeat. fluent speech. No obvious aphasia or dysarthria noted.   Cranial Nerves: PERRL, EOMI, VFFC, sensation V1-V3 intact,  no obvious facial asymmetry, equal elevation of palate, scm/trap 5/5, tongue is midline on protrusion. no obvious papilledema on fundoscopic exam. hearing is grossly intact.   Motor: Normal bulk, tone and strength throughout. Fine finger movements were intact and symmetric. no tremors or drift noted.    Sensation: Intact to light touch and pinprick throughout. no right/left confusion. no extinction to tactile on DSS. Romberg was negative.   Reflexes: 1+ throughout at biceps, brachioradialis, triceps, patellars and ankles bilaterally and equal. No clonus. R toe and L toe were both downgoing.  Coordination: No dysmetria on FNF or HKS  Gait: Narrow based and steady. Able to tandem. no limitations in gait.     Data/Labs/Imaging which I personally reviewed.     Labs:     CBC Full  -  ( 20 Dec 2023 06:51 )  WBC Count : 7.18 K/uL  RBC Count : 3.47 M/uL  Hemoglobin : 10.3 g/dL  Hematocrit : 31.7 %  Platelet Count - Automated : 274 K/uL  Mean Cell Volume : 91.4 fl  Mean Cell Hemoglobin : 29.7 pg  Mean Cell Hemoglobin Concentration : 32.5 g/dL  Auto Neutrophil # : x  Auto Lymphocyte # : x  Auto Monocyte # : x  Auto Eosinophil # : x  Auto Basophil # : x  Auto Neutrophil % : x  Auto Lymphocyte % : x  Auto Monocyte % : x  Auto Eosinophil % : x  Auto Basophil % : x    12-20    143  |  112<H>  |  22  ----------------------------<  92  3.7   |  25  |  1.09    Ca    8.9      20 Dec 2023 06:51  Phos  4.1     12-19  Mg     2.3     12-19    TPro  7.6  /  Alb  3.9  /  TBili  0.6  /  DBili  x   /  AST  21  /  ALT  27  /  AlkPhos  91  12-19    LIVER FUNCTIONS - ( 19 Dec 2023 09:30 )  Alb: 3.9 g/dL / Pro: 7.6 gm/dL / ALK PHOS: 91 U/L / ALT: 27 U/L / AST: 21 U/L / GGT: x             Urinalysis Basic - ( 20 Dec 2023 06:51 )    Color: x / Appearance: x / SG: x / pH: x  Gluc: 92 mg/dL / Ketone: x  / Bili: x / Urobili: x   Blood: x / Protein: x / Nitrite: x   Leuk Esterase: x / RBC: x / WBC x   Sq Epi: x / Non Sq Epi: x / Bacteria: x          < from: CT Head No Cont (12.19.23 @ 11:39) >  IMPRESSION:    CT HEAD:  1.  No evidence of acute intracranial hemorrhage or midline shift.  2.  Chronic small vessel disease. If there is continued concern for acute   neurologic compromise, recommend MRI of the brain for further evaluation.    CTA BRAIN:  1.  No evidence of intracranial aneurysm, critical stenosis, or abrupt   cut off of intraluminal contrast.    CTA NECK:  1.  No evidence of critical stenosis by NASCET criteria.  2.  Soft tissue fullness to the left vocal fold. Recommend direct visual   inspection.    < end of copied text >  < from: CT Lumbar Spine No Cont (12.19.23 @ 16:59) >    IMPRESSION: Mild levoscoliosis. Degenerative changes at multiple levels   with multi level vacuum disc phenomenon and degenerative facet changes.   Moderate to severe spinal stenosis L3-4. Severe spinal stenosis L4-5.    < end of copied text >  < from: CT Cervical Spine No Cont (12.19.23 @ 16:59) >    IMPRESSION:  No acute fracture.    Multilevel degenerative changes.    Medialization of the left vocal cord suggestive of vocal cord paralysis.   Correlate with direct visualization.    MRI cervical spine should be considered if further evaluation of the   spinal canal soft tissue contents is required.    < end of copied text >  < from: MR Head No Cont (12.20.23 @ 11:37) >    IMPRESSION: No acute hemorrhage mass or mass effect.    MRI of the lumbar spine was performed sagittal T1-T2 and STIR sequences.   Axial T1 and T2-weighted sequence was performed. Coronal T1-weighted   sequence was performed    Reversal of the normallumbar lordosis is seen.    Mild scoliosis is seen    There is grade 1 anterolisthesis involving L4 on L5. Possible lysis   defect is seen on the left side. This can be confirmed with plain film.    The vertebral body height appears maintained.    Schmorl's node is seen involving multiple levels secondary to chronic   degenerative    Disc desiccation is seen throughout the lower thoracic and lumbar spine   region secondary to chronic degenerative changes.    Hemangioma is seen involving the T12 vertebral body    T11-12: Normal    T12-L1: Bilateral hypertrophic facet changes. Moderate narrowing of the   left neural foramen and moderate to severe narrowing right neural foramen.    L1-2: Bilateral hypertrophic facet joint changes are seen. Mild narrowing   of the spinal canal.    L2-3: Disc bulge and bilateral hypertrophic facet joint changes seen.   Hypertrophic change involving both ligamentum flavum. Moderate narrowing   of the spinal canal. Moderate narrowing of the left neural foramen    L3-4: Disc bulge and bilateral hypertrophic facet joint changes seen.   Hypertrophic changes seen involving both ligamentum flavum. Moderate to   severe narrowing of the spinal canal. Moderate narrowing of the left   neural foramen    L4-5: Disc bulge and small central disc herniation is seen. Hypertrophic   change involving both facet and ligamentum flavum. Severe narrowing of   the spinal canal seen with the compression of the thecal sac. Moderate   narrowing of the right neural foramen and severe narrowing of the left   neural foramen    L5-S1: Disc bulge and bilateral hypertrophic facet joint changes seen.   Moderate to severe narrowing of the left neural foramen    The conus ends at L1 and appears normal    Evaluation of the paraspinal soft tissues appear normal.    Bilateral renal cysts are seen.    Both SI joints appear intact.    IMPRESSION: Degenerative changes as described above.    < end of copied text >   Neurology Consult    Reason for Consult: Patient is a 73y old  Female who presents with a chief complaint of LLE weakness, B/L hand numbness (20 Dec 2023 19:20)      HPI:         74 y/o Morbidly obese F w/ PMHx of HTN, HLD, GERD, Hypothyroid presents to the ED c/o LLE weakness and b/l hand numbness. Pt reports the week prior she had sciatica which has since resolved however after the pain went away she noted her LLE was weak which never happened in the past when she had sciatica before. Pt typically ambulates on her own however in the last 2 days has had to use a cane. Pt also reports this morning she woke up w/ b/l hand numbness and tingling as well and R sided neck pain. pt denies fall or trauma to neck or back. Pt denies bowel or bladder incontinence or saddle anesthesia.      Of note pt came in to the ED w/ L arm numbness on Nov 29th, ED work up was neg and pt told to follow up w/ neurology however she did not.     In ED initial vitals stable. Labs stable. Pt underwent CT head and Angio for presumed possible CVA which were unremarkable (19 Dec 2023 15:50)     Pt was seen in ED 3 weeks ago w/ similar sx + GI Sx + LUE Swelling (Resolved), dx'd w/ Infectious colitis and dc'd home. No loss in hand dexterity. Does not feel off balance w/ walking. Denies CP, SOB, fever, chills or any other complaints.        PAST MEDICAL & SURGICAL HISTORY:  Hypertension      High cholesterol      Hypothyroid      No significant past surgical history          Allergies: Allergies    No Known Allergies    Intolerances        Social History: Denies toxic habits including tobacco, ETOH or illicit drugs.    Family History: FAMILY HISTORY:  No pertinent family history in first degree relatives    . No family history of strokes    Medications: MEDICATIONS  (STANDING):  amLODIPine   Tablet 10 milliGRAM(s) Oral daily  aspirin enteric coated 81 milliGRAM(s) Oral daily  atorvastatin 80 milliGRAM(s) Oral at bedtime  enoxaparin Injectable 40 milliGRAM(s) SubCutaneous every 12 hours  levothyroxine 25 MICROGram(s) Oral daily  lisinopril 40 milliGRAM(s) Oral daily  pantoprazole    Tablet 40 milliGRAM(s) Oral before breakfast    MEDICATIONS  (PRN):  acetaminophen     Tablet .. 650 milliGRAM(s) Oral every 6 hours PRN Temp greater or equal to 38C (100.4F), Mild Pain (1 - 3)  aluminum hydroxide/magnesium hydroxide/simethicone Suspension 30 milliLiter(s) Oral every 4 hours PRN Dyspepsia  cyclobenzaprine 5 milliGRAM(s) Oral three times a day PRN Muscle Spasm  ketorolac   Injectable 15 milliGRAM(s) IV Push every 6 hours PRN Severe Pain (7 - 10)  melatonin 3 milliGRAM(s) Oral at bedtime PRN Insomnia  ondansetron Injectable 4 milliGRAM(s) IV Push every 8 hours PRN Nausea and/or Vomiting      Review of Systems:  CONSTITUTIONAL:  No weight loss, fever, chills, weakness or fatigue.  HEENT:  Eyes:  No visual loss, blurred vision, double vision or yellow sclera. Ears, Nose, Throat:  No hearing loss, sneezing, congestion, runny nose or sore throat.  SKIN:  No rash or itching.  CARDIOVASCULAR:  No chest pain, chest pressure or chest discomfort. No palpitations or edema.  RESPIRATORY:  No shortness of breath, cough or sputum.  GASTROINTESTINAL:  No anorexia, nausea, vomiting or diarrhea. No abdominal pain or blood.  GENITOURINARY:  No burning on urination or incontinence   NEUROLOGICAL:  No headache, dizziness, syncope, paralysis, ataxia, numbness or tingling in the extremities. No change in bowel or bladder control. no limb weakness. no vision changes.   MUSCULOSKELETAL:  No muscle, back pain, joint pain or stiffness.  HEMATOLOGIC:  No anemia, bleeding or bruising.  LYMPHATICS:  No enlarged nodes. No history of splenectomy.  PSYCHIATRIC:  No history of depression or anxiety.  ENDOCRINOLOGIC:  No reports of sweating, cold or heat intolerance. No polyuria or polydipsia.      Vitals:  Vital Signs Last 24 Hrs  T(C): 36.5 (21 Dec 2023 04:49), Max: 37 (20 Dec 2023 16:11)  T(F): 97.7 (21 Dec 2023 04:49), Max: 98.6 (20 Dec 2023 16:11)  HR: 71 (21 Dec 2023 04:49) (69 - 86)  BP: 107/72 (21 Dec 2023 04:49) (107/72 - 120/76)  BP(mean): --  RR: 18 (21 Dec 2023 04:49) (18 - 18)  SpO2: 93% (21 Dec 2023 04:49) (93% - 98%)    Parameters below as of 21 Dec 2023 04:49  Patient On (Oxygen Delivery Method): room air        General Exam:   General Appearance: Appropriately dressed and in no acute distress       Head: Normocephalic, atraumatic and no dysmorphic features  Ear, Nose, and Throat: Moist mucous membranes  CVS: S1S2+  Resp: No SOB, no wheeze or rhonchi  GI: soft NT/ND  Extremities: No edema or cyanosis  Skin: No bruises or rashes     Neurological Exam:  Mental Status: Awake, alert and oriented x 3.  Able to follow simple and complex verbal commands. Able to name and repeat. fluent speech. No obvious aphasia or dysarthria noted.   Cranial Nerves: PERRL, EOMI, VFFC, sensation V1-V3 intact,  no obvious facial asymmetry, equal elevation of palate, scm/trap 5/5, tongue is midline on protrusion. hearing is grossly intact.   Motor: poor effort throughout, LLE 4-/5 distally. 4/5 proximally  Sensation: Intact to light touch and pinprick throughout.  Reflexes: brisk throughout, Hoffmans neg. No clonus. R toe and L toe were both downgoing.  Coordination: No dysmetria on FNF  Gait: deferred    Data/Labs/Imaging which I personally reviewed.     Labs:     CBC Full  -  ( 20 Dec 2023 06:51 )  WBC Count : 7.18 K/uL  RBC Count : 3.47 M/uL  Hemoglobin : 10.3 g/dL  Hematocrit : 31.7 %  Platelet Count - Automated : 274 K/uL  Mean Cell Volume : 91.4 fl  Mean Cell Hemoglobin : 29.7 pg  Mean Cell Hemoglobin Concentration : 32.5 g/dL  Auto Neutrophil # : x  Auto Lymphocyte # : x  Auto Monocyte # : x  Auto Eosinophil # : x  Auto Basophil # : x  Auto Neutrophil % : x  Auto Lymphocyte % : x  Auto Monocyte % : x  Auto Eosinophil % : x  Auto Basophil % : x    12-20    143  |  112<H>  |  22  ----------------------------<  92  3.7   |  25  |  1.09    Ca    8.9      20 Dec 2023 06:51  Phos  4.1     12-19  Mg     2.3     12-19    TPro  7.6  /  Alb  3.9  /  TBili  0.6  /  DBili  x   /  AST  21  /  ALT  27  /  AlkPhos  91  12-19    LIVER FUNCTIONS - ( 19 Dec 2023 09:30 )  Alb: 3.9 g/dL / Pro: 7.6 gm/dL / ALK PHOS: 91 U/L / ALT: 27 U/L / AST: 21 U/L / GGT: x             Urinalysis Basic - ( 20 Dec 2023 06:51 )    Color: x / Appearance: x / SG: x / pH: x  Gluc: 92 mg/dL / Ketone: x  / Bili: x / Urobili: x   Blood: x / Protein: x / Nitrite: x   Leuk Esterase: x / RBC: x / WBC x   Sq Epi: x / Non Sq Epi: x / Bacteria: x          < from: CT Head No Cont (12.19.23 @ 11:39) >  IMPRESSION:    CT HEAD:  1.  No evidence of acute intracranial hemorrhage or midline shift.  2.  Chronic small vessel disease. If there is continued concern for acute   neurologic compromise, recommend MRI of the brain for further evaluation.    CTA BRAIN:  1.  No evidence of intracranial aneurysm, critical stenosis, or abrupt   cut off of intraluminal contrast.    CTA NECK:  1.  No evidence of critical stenosis by NASCET criteria.  2.  Soft tissue fullness to the left vocal fold. Recommend direct visual   inspection.    < end of copied text >  < from: CT Lumbar Spine No Cont (12.19.23 @ 16:59) >    IMPRESSION: Mild levoscoliosis. Degenerative changes at multiple levels   with multi level vacuum disc phenomenon and degenerative facet changes.   Moderate to severe spinal stenosis L3-4. Severe spinal stenosis L4-5.    < end of copied text >  < from: CT Cervical Spine No Cont (12.19.23 @ 16:59) >    IMPRESSION:  No acute fracture.    Multilevel degenerative changes.    Medialization of the left vocal cord suggestive of vocal cord paralysis.   Correlate with direct visualization.    MRI cervical spine should be considered if further evaluation of the   spinal canal soft tissue contents is required.    < end of copied text >  < from: MR Head No Cont (12.20.23 @ 11:37) >    IMPRESSION: No acute hemorrhage mass or mass effect.    MRI of the lumbar spine was performed sagittal T1-T2 and STIR sequences.   Axial T1 and T2-weighted sequence was performed. Coronal T1-weighted   sequence was performed    Reversal of the normallumbar lordosis is seen.    Mild scoliosis is seen    There is grade 1 anterolisthesis involving L4 on L5. Possible lysis   defect is seen on the left side. This can be confirmed with plain film.    The vertebral body height appears maintained.    Schmorl's node is seen involving multiple levels secondary to chronic   degenerative    Disc desiccation is seen throughout the lower thoracic and lumbar spine   region secondary to chronic degenerative changes.    Hemangioma is seen involving the T12 vertebral body    T11-12: Normal    T12-L1: Bilateral hypertrophic facet changes. Moderate narrowing of the   left neural foramen and moderate to severe narrowing right neural foramen.    L1-2: Bilateral hypertrophic facet joint changes are seen. Mild narrowing   of the spinal canal.    L2-3: Disc bulge and bilateral hypertrophic facet joint changes seen.   Hypertrophic change involving both ligamentum flavum. Moderate narrowing   of the spinal canal. Moderate narrowing of the left neural foramen    L3-4: Disc bulge and bilateral hypertrophic facet joint changes seen.   Hypertrophic changes seen involving both ligamentum flavum. Moderate to   severe narrowing of the spinal canal. Moderate narrowing of the left   neural foramen    L4-5: Disc bulge and small central disc herniation is seen. Hypertrophic   change involving both facet and ligamentum flavum. Severe narrowing of   the spinal canal seen with the compression of the thecal sac. Moderate   narrowing of the right neural foramen and severe narrowing of the left   neural foramen    L5-S1: Disc bulge and bilateral hypertrophic facet joint changes seen.   Moderate to severe narrowing of the left neural foramen    The conus ends at L1 and appears normal    Evaluation of the paraspinal soft tissues appear normal.    Bilateral renal cysts are seen.    Both SI joints appear intact.    IMPRESSION: Degenerative changes as described above.    < end of copied text >

## 2023-12-21 NOTE — PROGRESS NOTE ADULT - ASSESSMENT
74 y/o Morbidly obese F w/ PMHx of HTN, HLD, GERD, Hypothyroid presents to the ED c/o LLE weakness and b/l hand numbness w/ R sided neck pain.      LLE weakness and b/l hand numbness.  - Low susp for TIA/CVA at this time - likely LE related to lumbar process and hand numbness related to cervical process  - CT C spine showed severe C5-C6 and C6-C7 degenerative disc disease  - MRI showed no CVA  - CT lumbar spine showed moderate to severe spinal stenosis L3-4. Severe spinal stenosis L4-5  - MRI L spine showed  T12-L1 moderate narrowing of the left neural foramen and moderate to severe narrowing right neural foramen, L2-3 moderate narrowing of the spinal canal w/ moderate narrowing of the left neural foramen, L3-4 moderate to severe narrowing of the spinal canal w/ moderate narrowing of the left neural foramen. L4-5 severe narrowing of the spinal canal with the compression of the thecal sac w/ moderate narrowing of the right neural foramen and severe narrowing of the left neural foramen and L5-S1 moderate to severe narrowing of the left neural foramen  - PT eval  - ortho note read and appreciated, patient is Ortho stable for dc as no acute orthopedic surgical interventions are indicated  - as per Ortho, patient started on IV Decadron, Flexeril & Gabapentin  - as per Ortho, patient will need to follow up with Dr. 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

## 2023-12-21 NOTE — PHYSICAL THERAPY INITIAL EVALUATION ADULT - ADDITIONAL COMMENTS
Pt states her baseline is she is independent and does not use any walking device, she reports she has a cane, daughter Donna lives about 15 minutes visits/gives assistance, and confirmed she can take patient to an out-patient PT facility nearby.

## 2023-12-21 NOTE — PHYSICAL THERAPY INITIAL EVALUATION ADULT - STRENGTHENING, PT EVAL
Improve strength in the UE and LE to 5/5, endurance to fair or good and be able to perform functional tasks-bed mobility, sitting, standing, transfers and ambulate in a safe manner with or without  assistive device and prevent falls.

## 2023-12-21 NOTE — PROGRESS NOTE ADULT - SUBJECTIVE AND OBJECTIVE BOX
72 y/o Morbidly obese F w/ PMHx of HTN, HLD, GERD, Hypothyroid presents to the ED c/o LLE weakness and b/l hand numbness w/ R sided neck pain. She is lying in bed in NAD.     MEDICATIONS  (STANDING):  amLODIPine   Tablet 10 milliGRAM(s) Oral daily  aspirin enteric coated 81 milliGRAM(s) Oral daily  atorvastatin 80 milliGRAM(s) Oral at bedtime  dexAMETHasone  Injectable 6 milliGRAM(s) IV Push every 8 hours  enoxaparin Injectable 40 milliGRAM(s) SubCutaneous every 12 hours  levothyroxine 25 MICROGram(s) Oral daily  lisinopril 40 milliGRAM(s) Oral daily  pantoprazole    Tablet 40 milliGRAM(s) Oral before breakfast    MEDICATIONS  (PRN):  acetaminophen     Tablet .. 650 milliGRAM(s) Oral every 6 hours PRN Temp greater or equal to 38C (100.4F), Mild Pain (1 - 3)  aluminum hydroxide/magnesium hydroxide/simethicone Suspension 30 milliLiter(s) Oral every 4 hours PRN Dyspepsia  cyclobenzaprine 5 milliGRAM(s) Oral three times a day PRN Muscle Spasm  ketorolac   Injectable 15 milliGRAM(s) IV Push every 6 hours PRN Severe Pain (7 - 10)  melatonin 3 milliGRAM(s) Oral at bedtime PRN Insomnia  ondansetron Injectable 4 milliGRAM(s) IV Push every 8 hours PRN Nausea and/or Vomiting      Allergies    No Known Allergies    Intolerances        Vital Signs Last 24 Hrs  T(C): 36.7 (21 Dec 2023 16:18), Max: 36.8 (21 Dec 2023 12:15)  T(F): 98.1 (21 Dec 2023 16:18), Max: 98.3 (21 Dec 2023 12:15)  HR: 82 (21 Dec 2023 16:18) (69 - 82)  BP: 125/76 (21 Dec 2023 16:18) (107/72 - 125/76)  BP(mean): --  RR: 18 (21 Dec 2023 16:18) (18 - 19)  SpO2: 93% (21 Dec 2023 16:18) (93% - 95%)    Parameters below as of 21 Dec 2023 12:15  Patient On (Oxygen Delivery Method): room air        PHYSICAL EXAM:  GENERAL: NAD, well-groomed, well-developed  HEAD:  Atraumatic, Normocephalic  EYES: EOMI, PERRLA   NECK: Supple, No JVD, Normal thyroid  NERVOUS SYSTEM:  Alert & Oriented X3   CHEST/LUNG: Clear to auscultation bilaterally; No rales, rhonchi, wheezing, or rubs  HEART: Regular rate and rhythm; No murmurs, rubs, or gallops  ABDOMEN: Soft, Nontender, Nondistended; Bowel sounds present  EXTREMITIES:  No clubbing, cyanosis, or edema    LABS:                        10.3   7.18  )-----------( 274      ( 20 Dec 2023 06:51 )             31.7     12-20    143  |  112<H>  |  22  ----------------------------<  92  3.7   |  25  |  1.09    Ca    8.9      20 Dec 2023 06:51        Urinalysis Basic - ( 20 Dec 2023 06:51 )    Color: x / Appearance: x / SG: x / pH: x  Gluc: 92 mg/dL / Ketone: x  / Bili: x / Urobili: x   Blood: x / Protein: x / Nitrite: x   Leuk Esterase: x / RBC: x / WBC x   Sq Epi: x / Non Sq Epi: x / Bacteria: x         RADIOLOGY & ADDITIONAL TESTS:    12-20-23 @ 07:01  -  12-21-23 @ 07:00  --------------------------------------------------------  IN:    Oral Fluid: 780 mL  Total IN: 780 mL    OUT:  Total OUT: 0 mL    Total NET: 780 mL      12-21-23 @ 07:01  -  12-21-23 @ 20:22  --------------------------------------------------------  IN:    Oral Fluid: 240 mL  Total IN: 240 mL    OUT:  Total OUT: 0 mL    Total NET: 240 mL

## 2023-12-21 NOTE — PHYSICAL THERAPY INITIAL EVALUATION ADULT - PERTINENT HX OF CURRENT PROBLEM, REHAB EVAL
Pt states she admitted with c/o weakness, numbness and tingling in the LLE. Pt denies fall or trauma, baseline is ambulatory with no assistive device or a cane occassionally.

## 2023-12-21 NOTE — CONSULT NOTE ADULT - ASSESSMENT
74 y/o Morbidly obese F w/ PMHx of HTN, HLD, GERD, Hypothyroid presents to the ED c/o LLE weakness and b/l hand numbness.  CTH with chronic white matter changes  CTA neg for significant stenosis  MRI brain neg  MR Lspine with severe degenerative changes    Impression: LLE weakness due to severe lumbar spinal stenosis.   Hand numbness possibly from cervical disease    Plan:  - on steroids per otho  - no acute intervention  - PT/OT  - outpatient follow up for EMG of uppers/lowers and cspine MRI    Erica Silveira DO  Vascular Neurology  Office [phone]    74 y/o Morbidly obese F w/ PMHx of HTN, HLD, GERD, Hypothyroid presents to the ED c/o LLE weakness and b/l hand numbness.  CTH with chronic white matter changes  CTA neg for significant stenosis  MRI brain neg  MR Lspine with severe degenerative changes    Impression: LLE weakness due to severe lumbar spinal stenosis.   Hand numbness possibly from cervical disease    Plan:  - on steroids per otho  - no acute intervention  - PT/OT  - outpatient follow up for EMG of uppers/lowers and cspine MRI    Erica Silveira DO  Vascular Neurology  Office [phone]    74 y/o Morbidly obese F w/ PMHx of HTN, HLD, GERD, Hypothyroid presents to the ED c/o LLE weakness and b/l hand numbness.  CTH with chronic white matter changes  CTA neg for significant stenosis  MRI brain neg  MR Lspine with severe degenerative changes    Impression: LLE weakness due to severe lumbar spinal stenosis.   Hand numbness, myelopathic exam findings possibly from cervical disease    Plan:  - on steroids per ortho  - Please send the following peripheral neuropathy labs: B1, B6, B12, Folate, Methylmalonic Acid, Homocysteine, HbA1c, TSH, ESR, CRP.   - no acute intervention per Ortho  - trial of gabapentin - start 100mg BID and titrate up as needed  - PT/OT  - outpatient follow up for EMG of uppers/lowers and cspine MRI  - outpatient ENT eval for possible vocal cord paralysis noted on imaging    Erica Silveira DO  Vascular Neurology  Office [phone]    74 y/o Morbidly obese F w/ PMHx of HTN, HLD, GERD, Hypothyroid presents to the ED c/o LLE weakness and b/l hand numbness.  CTH with chronic white matter changes  CTA neg for significant stenosis  MRI brain neg  MR Lspine with severe degenerative changes    Impression: LLE weakness due to severe lumbar spinal stenosis.   Hand numbness, myelopathic exam findings possibly from cervical disease    Plan:  - on steroids per ortho  - Please send the following peripheral neuropathy labs: B1, B6, B12, Folate, Methylmalonic Acid, Homocysteine, HbA1c, TSH, ESR, CRP.   - no acute intervention per Ortho  - trial of gabapentin - start 100mg BID and titrate up as needed  - PT/OT  - outpatient follow up for EMG of uppers/lowers and cspine MRI  - outpatient ENT eval for possible vocal cord paralysis noted on imaging    Erica Silveira DO  Vascular Neurology  Office [phone]

## 2023-12-22 ENCOUNTER — TRANSCRIPTION ENCOUNTER (OUTPATIENT)
Age: 73
End: 2023-12-22

## 2023-12-22 VITALS
HEART RATE: 95 BPM | OXYGEN SATURATION: 94 % | DIASTOLIC BLOOD PRESSURE: 77 MMHG | RESPIRATION RATE: 17 BRPM | SYSTOLIC BLOOD PRESSURE: 127 MMHG | TEMPERATURE: 98 F

## 2023-12-22 LAB
A1C WITH ESTIMATED AVERAGE GLUCOSE RESULT: 6 % — HIGH (ref 4–5.6)
A1C WITH ESTIMATED AVERAGE GLUCOSE RESULT: 6 % — HIGH (ref 4–5.6)
CRP SERPL-MCNC: <3 MG/L — SIGNIFICANT CHANGE UP
CRP SERPL-MCNC: <3 MG/L — SIGNIFICANT CHANGE UP
ERYTHROCYTE [SEDIMENTATION RATE] IN BLOOD: 21 MM/HR — HIGH (ref 0–20)
ERYTHROCYTE [SEDIMENTATION RATE] IN BLOOD: 21 MM/HR — HIGH (ref 0–20)
ESTIMATED AVERAGE GLUCOSE: 126 MG/DL — HIGH (ref 68–114)
ESTIMATED AVERAGE GLUCOSE: 126 MG/DL — HIGH (ref 68–114)
FOLATE SERPL-MCNC: 6.2 NG/ML — SIGNIFICANT CHANGE UP
FOLATE SERPL-MCNC: 6.2 NG/ML — SIGNIFICANT CHANGE UP
TSH SERPL-MCNC: 0.66 UIU/ML — SIGNIFICANT CHANGE UP (ref 0.36–3.74)
TSH SERPL-MCNC: 0.66 UIU/ML — SIGNIFICANT CHANGE UP (ref 0.36–3.74)
VIT B12 SERPL-MCNC: 911 PG/ML — SIGNIFICANT CHANGE UP (ref 232–1245)
VIT B12 SERPL-MCNC: 911 PG/ML — SIGNIFICANT CHANGE UP (ref 232–1245)

## 2023-12-22 PROCEDURE — 99239 HOSP IP/OBS DSCHRG MGMT >30: CPT

## 2023-12-22 RX ORDER — LEVOTHYROXINE SODIUM 125 MCG
1 TABLET ORAL
Qty: 0 | Refills: 0 | DISCHARGE

## 2023-12-22 RX ORDER — ATORVASTATIN CALCIUM 80 MG/1
1 TABLET, FILM COATED ORAL
Qty: 0 | Refills: 0 | DISCHARGE

## 2023-12-22 RX ORDER — DEXLANSOPRAZOLE 30 MG/1
1 CAPSULE, DELAYED RELEASE ORAL
Qty: 0 | Refills: 0 | DISCHARGE

## 2023-12-22 RX ORDER — ASPIRIN/CALCIUM CARB/MAGNESIUM 324 MG
1 TABLET ORAL
Qty: 0 | Refills: 0 | DISCHARGE

## 2023-12-22 RX ORDER — EZETIMIBE 10 MG/1
1 TABLET ORAL
Qty: 0 | Refills: 0 | DISCHARGE

## 2023-12-22 RX ORDER — GABAPENTIN 400 MG/1
1 CAPSULE ORAL
Qty: 42 | Refills: 0
Start: 2023-12-22 | End: 2024-01-11

## 2023-12-22 RX ORDER — CYCLOBENZAPRINE HYDROCHLORIDE 10 MG/1
1 TABLET, FILM COATED ORAL
Qty: 15 | Refills: 0
Start: 2023-12-22 | End: 2023-12-26

## 2023-12-22 RX ORDER — RAMIPRIL 5 MG
10 CAPSULE ORAL
Qty: 0 | Refills: 0 | DISCHARGE

## 2023-12-22 RX ORDER — AMLODIPINE BESYLATE 2.5 MG/1
1 TABLET ORAL
Qty: 0 | Refills: 0 | DISCHARGE

## 2023-12-22 RX ADMIN — Medication 650 MILLIGRAM(S): at 04:00

## 2023-12-22 RX ADMIN — LISINOPRIL 40 MILLIGRAM(S): 2.5 TABLET ORAL at 05:20

## 2023-12-22 RX ADMIN — PANTOPRAZOLE SODIUM 40 MILLIGRAM(S): 20 TABLET, DELAYED RELEASE ORAL at 06:08

## 2023-12-22 RX ADMIN — GABAPENTIN 100 MILLIGRAM(S): 400 CAPSULE ORAL at 05:20

## 2023-12-22 RX ADMIN — AMLODIPINE BESYLATE 10 MILLIGRAM(S): 2.5 TABLET ORAL at 05:19

## 2023-12-22 RX ADMIN — Medication 6 MILLIGRAM(S): at 05:19

## 2023-12-22 RX ADMIN — Medication 650 MILLIGRAM(S): at 03:06

## 2023-12-22 RX ADMIN — Medication 6 MILLIGRAM(S): at 14:16

## 2023-12-22 RX ADMIN — Medication 81 MILLIGRAM(S): at 12:38

## 2023-12-22 RX ADMIN — Medication 25 MICROGRAM(S): at 05:20

## 2023-12-22 NOTE — DISCHARGE NOTE PROVIDER - NSDCMRMEDTOKEN_GEN_ALL_CORE_FT
amLODIPine 10 mg oral tablet: 1 tab(s) orally once a day  aspirin 81 mg oral tablet: 1 tab(s) orally once a day  atorvastatin 80 mg oral tablet: 1 tab(s) orally once a day  cyclobenzaprine 5 mg oral tablet: 1 tab(s) orally 3 times a day as needed for Muscle Spasm MDD: 3 tabs  Dexilant 60 mg oral delayed release capsule: 1 cap(s) orally once a day  gabapentin 100 mg oral capsule: 1 cap(s) orally 2 times a day  Medrol Dosepak 4 mg oral tablet: 1 tab(s) orally once a day Follow directions on the Dosepak  ramipril 10 mg oral tablet: 10 tablet orally once a day  Synthroid 25 mcg (0.025 mg) oral tablet: 1 tab(s) orally once a day  Zetia 10 mg oral tablet: 1 tab(s) orally once a day

## 2023-12-22 NOTE — DISCHARGE NOTE NURSING/CASE MANAGEMENT/SOCIAL WORK - NSDCPETBCESMAN_GEN_ALL_CORE
If you are a smoker, it is important for your health to stop smoking. Please be aware that second hand smoke is also harmful.
08-Jan-2024

## 2023-12-22 NOTE — DISCHARGE NOTE PROVIDER - CARE PROVIDER_API CALL
Wei Mehta  Orthopaedic Surgery  [address]  [place] [address]  Phone: [phone]  Fax: ()-  Follow Up Time: 2 weeks    Erica Silveira  Neurology  [address]  [place] [address]  Phone: [phone]  Fax: [phone]  Follow Up Time: 1 week   Wei Mehta  Orthopaedic Surgery  [address]  [place] [address]  Phone: [phone]  Fax: ()-  Follow Up Time: 2 weeks    Erica Silveira  Neurology  [address]  [place] [address]  Phone: [phone]  Fax: [phone]  Follow Up Time: 1 week

## 2023-12-22 NOTE — DISCHARGE NOTE NURSING/CASE MANAGEMENT/SOCIAL WORK - NSDCPEFALRISK_GEN_ALL_CORE
For information on Fall & Injury Prevention, visit: [email or web address] OR  [email or web address] OR  https://www.cdc.gov/steadi/patient.html For information on Fall & Injury Prevention, visit: [email or web address] OR  [email or web address] OR  https://www.cdc.gov/steadi/patient.html

## 2023-12-22 NOTE — DISCHARGE NOTE NURSING/CASE MANAGEMENT/SOCIAL WORK - PATIENT PORTAL LINK FT
You can access the FollowMyHealth Patient Portal offered by [facility] by registering at the following website: [email or web address]. By joining [employer]’s FollowMyHealth portal, you will also be able to view your health information using other applications (apps) compatible with our system. You can access the FollowMyHealth Patient Portal offered by [facility] by registering at the following website: [email or web address]. By joining [facility]’s FollowMyHealth portal, you will also be able to view your health information using other applications (apps) compatible with our system.

## 2023-12-22 NOTE — DISCHARGE NOTE PROVIDER - HOSPITAL COURSE
72 y/o Morbidly obese F w/ PMHx of HTN, HLD, GERD, Hypothyroid presents to the ED c/o LLE weakness and b/l hand numbness w/ R sided neck pain. MRI showed no CVA. CT C spine showed severe C5-C6 and C6-C7 degenerative disc disease and CT lumbar spine showed moderate to severe spinal stenosis L3-4. Severe spinal stenosis L4-5. MRI L spine showed  T12-L1 moderate narrowing of the left neural foramen and moderate to severe narrowing right neural foramen, L2-3 moderate narrowing of the spinal canal w/ moderate narrowing of the left neural foramen, L3-4 moderate to severe narrowing of the spinal canal w/ moderate narrowing of the left neural foramen. L4-5 severe narrowing of the spinal canal with the compression of the thecal sac w/ moderate narrowing of the right neural foramen and severe narrowing of the left neural foramen and L5-S1 moderate to severe narrowing of the left neural foramen. As per neurology, LLE weakness was likely due to severe lumbar spinal stenosis and hand numbness was likely due to cervical disease. Ortho was consulted and recommended no acute orthopedic surgical interventions. As per Ortho & neuro, the patient was started on IV Decadron, Flexeril & Gabapentin Ass per neurology, patient will need outpatient follow up for EMG of uppers/lowers and c-spine MRI. Pt reported improvement in her symptoms and was told to follow up w/ ortho and neurology. Of note, her CT showed medialization of the left vocal cord suggestive of vocal cord paralysis on CT. 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     Discharge time: 43 minutes       Vital Signs Last 24 Hrs  T(C): 36.6 (22 Dec 2023 10:26), Max: 36.8 (21 Dec 2023 12:15)  T(F): 97.8 (22 Dec 2023 10:26), Max: 98.3 (21 Dec 2023 12:15)  HR: 95 (22 Dec 2023 10:26) (71 - 103)  BP: 127/77 (22 Dec 2023 10:26) (121/73 - 137/85)   RR: 17 (22 Dec 2023 10:26) (16 - 19)  SpO2: 94% (22 Dec 2023 10:26) (93% - 95%)    Parameters below as of 22 Dec 2023 04:55  Patient On (Oxygen Delivery Method): room air     PHYSICAL EXAM:  GENERAL: NAD, well-groomed, well-developed  HEAD:  Atraumatic, Normocephalic  EYES: EOMI, PERRLA   NECK: Supple, No JVD, Normal thyroid  NERVOUS SYSTEM:  Alert & Oriented X3   CHEST/LUNG: Clear to auscultation bilaterally; No rales, rhonchi, wheezing, or rubs  HEART: Regular rate and rhythm; No murmurs, rubs, or gallops  ABDOMEN: Soft, Nontender, Nondistended; Bowel sounds present  EXTREMITIES:  No clubbing, cyanosis, or edema

## 2023-12-22 NOTE — DISCHARGE NOTE PROVIDER - PROVIDER TOKENS
PROVIDER:[TOKEN:[[id number]],FOLLOWUP:[2 weeks]],PROVIDER:[TOKEN:[[id number]],FOLLOWUP:[1 week]] PROVIDER:[TOKEN:[[id number]],FOLLOWUP:[2 weeks]],PROVIDER:[TOKEN:[[id number]],FOLLOWUP:[1 week]]

## 2023-12-26 LAB
HOMOCYSTEINE LEVEL: 7.6 UMOL/L — SIGNIFICANT CHANGE UP
HOMOCYSTEINE LEVEL: 7.6 UMOL/L — SIGNIFICANT CHANGE UP
METHYLMALONIC ACID LEVEL: 130 NMOL/L — SIGNIFICANT CHANGE UP (ref 87–318)
METHYLMALONIC ACID LEVEL: 130 NMOL/L — SIGNIFICANT CHANGE UP (ref 87–318)

## 2023-12-27 DIAGNOSIS — E78.5 HYPERLIPIDEMIA, UNSPECIFIED: ICD-10-CM

## 2023-12-27 DIAGNOSIS — I10 ESSENTIAL (PRIMARY) HYPERTENSION: ICD-10-CM

## 2023-12-27 DIAGNOSIS — E66.01 MORBID (SEVERE) OBESITY DUE TO EXCESS CALORIES: ICD-10-CM

## 2023-12-27 DIAGNOSIS — Z79.82 LONG TERM (CURRENT) USE OF ASPIRIN: ICD-10-CM

## 2023-12-27 DIAGNOSIS — M50.11 CERVICAL DISC DISORDER WITH RADICULOPATHY, HIGH CERVICAL REGION: ICD-10-CM

## 2023-12-27 DIAGNOSIS — K21.9 GASTRO-ESOPHAGEAL REFLUX DISEASE WITHOUT ESOPHAGITIS: ICD-10-CM

## 2023-12-27 DIAGNOSIS — E03.9 HYPOTHYROIDISM, UNSPECIFIED: ICD-10-CM

## 2023-12-27 DIAGNOSIS — M48.061 SPINAL STENOSIS, LUMBAR REGION WITHOUT NEUROGENIC CLAUDICATION: ICD-10-CM

## 2023-12-28 LAB
PYRIDOXAL PHOS SERPL-MCNC: 7.2 UG/L — SIGNIFICANT CHANGE UP (ref 3.4–65.2)
PYRIDOXAL PHOS SERPL-MCNC: 7.2 UG/L — SIGNIFICANT CHANGE UP (ref 3.4–65.2)

## 2024-06-04 NOTE — PROGRESS NOTE ADULT - PROVIDER SPECIALTY LIST ADULT
Hospitalist
Pt presents to the ED c/o wounds to the right foot, left foot, and left leg. Pt had right foot wound culture performed, which was positive. Pt has been taking Percocet for pain. Denies fevers. Pt told to come to the ED by Dr. Thorne for IV antibiotics.
Hospitalist

## 2024-12-10 NOTE — ED ADULT TRIAGE NOTE - HEART RATE (BEATS/MIN)
Controlled, continue same.    Orders:  •  losartan (COZAAR) 100 MG tablet; Take 1 tablet (100 mg total) by mouth daily  •  metoprolol succinate (TOPROL-XL) 25 mg 24 hr tablet; Take 1 tablet (25 mg total) by mouth daily   105